# Patient Record
Sex: FEMALE | Race: WHITE | NOT HISPANIC OR LATINO | Employment: OTHER | RURAL
[De-identification: names, ages, dates, MRNs, and addresses within clinical notes are randomized per-mention and may not be internally consistent; named-entity substitution may affect disease eponyms.]

---

## 2017-06-16 ENCOUNTER — HISTORICAL (OUTPATIENT)
Dept: ADMINISTRATIVE | Facility: HOSPITAL | Age: 72
End: 2017-06-16

## 2017-06-20 LAB
LAB AP COMMENTS: NORMAL
LAB AP GENERAL CAT - HISTORICAL: NORMAL
LAB AP INTERPRETATION/RESULT - HISTORICAL: NEGATIVE
LAB AP SPECIMEN ADEQUACY - HISTORICAL: NORMAL
LAB AP SPECIMEN SUBMITTED - HISTORICAL: NORMAL

## 2020-08-06 ENCOUNTER — HISTORICAL (OUTPATIENT)
Dept: ADMINISTRATIVE | Facility: HOSPITAL | Age: 75
End: 2020-08-06

## 2020-09-28 ENCOUNTER — HISTORICAL (OUTPATIENT)
Dept: ADMINISTRATIVE | Facility: HOSPITAL | Age: 75
End: 2020-09-28

## 2020-10-20 ENCOUNTER — HISTORICAL (OUTPATIENT)
Dept: ADMINISTRATIVE | Facility: HOSPITAL | Age: 75
End: 2020-10-20

## 2020-10-20 LAB
ALBUMIN SERPL BCP-MCNC: 4.1 G/DL (ref 3.5–5)
ALBUMIN/GLOB SERPL: 1.4 {RATIO}
ALP SERPL-CCNC: 56 U/L (ref 55–142)
ALT SERPL W P-5'-P-CCNC: 20 U/L (ref 13–56)
ANION GAP SERPL CALCULATED.3IONS-SCNC: 5 MMOL/L (ref 7–16)
AST SERPL W P-5'-P-CCNC: 22 U/L (ref 15–37)
BASOPHILS # BLD AUTO: 0.02 X10E3/UL (ref 0–0.2)
BASOPHILS NFR BLD AUTO: 0.4 % (ref 0–1)
BILIRUB SERPL-MCNC: 0.6 MG/DL (ref 0–1.2)
BUN SERPL-MCNC: 23 MG/DL (ref 7–18)
BUN/CREAT SERPL: 21
CALCIUM SERPL-MCNC: 9.1 MG/DL (ref 8.5–10.1)
CHLORIDE SERPL-SCNC: 106 MMOL/L (ref 98–107)
CO2 SERPL-SCNC: 35 MMOL/L (ref 21–32)
CREAT SERPL-MCNC: 1.09 MG/DL (ref 0.5–1.02)
EOSINOPHIL # BLD AUTO: 0.1 X10E3/UL (ref 0–0.5)
EOSINOPHIL NFR BLD AUTO: 2.1 % (ref 1–4)
ERYTHROCYTE [DISTWIDTH] IN BLOOD BY AUTOMATED COUNT: 13.2 % (ref 11.5–14.5)
GLOBULIN SER-MCNC: 3 G/DL (ref 2–4)
GLUCOSE SERPL-MCNC: 76 MG/DL (ref 74–106)
HCT VFR BLD AUTO: 42.4 % (ref 38–47)
HGB BLD-MCNC: 14 G/DL (ref 12–16)
IMM GRANULOCYTES # BLD AUTO: 0.01 X10E3/UL (ref 0–0.04)
IMM GRANULOCYTES NFR BLD: 0.2 % (ref 0–0.4)
LYMPHOCYTES # BLD AUTO: 1.6 X10E3/UL (ref 1–4.8)
LYMPHOCYTES NFR BLD AUTO: 34.3 % (ref 27–41)
MCH RBC QN AUTO: 31 PG (ref 27–31)
MCHC RBC AUTO-ENTMCNC: 33 G/DL (ref 32–36)
MCV RBC AUTO: 93.8 FL (ref 80–96)
MONOCYTES # BLD AUTO: 0.42 X10E3/UL (ref 0–0.8)
MONOCYTES NFR BLD AUTO: 9 % (ref 2–6)
MPC BLD CALC-MCNC: 11.2 FL (ref 9.4–12.4)
NEUTROPHILS # BLD AUTO: 2.51 X10E3/UL (ref 1.8–7.7)
NEUTROPHILS NFR BLD AUTO: 54 % (ref 53–65)
NRBC # BLD AUTO: 0 X10E3/UL (ref 0–0)
NRBC, AUTO (.00): 0 /100 (ref 0–0)
PLATELET # BLD AUTO: 150 X10E3/UL (ref 150–400)
POTASSIUM SERPL-SCNC: 3.7 MMOL/L (ref 3.5–5.1)
PROT SERPL-MCNC: 7.1 G/DL (ref 6.4–8.2)
RBC # BLD AUTO: 4.52 X10E6/UL (ref 4.2–5.4)
SARS-COV+SARS-COV-2 AG RESP QL IA.RAPID: NEGATIVE
SODIUM SERPL-SCNC: 142 MMOL/L (ref 136–145)
WBC # BLD AUTO: 4.66 X10E3/UL (ref 4.5–11)

## 2021-04-23 DIAGNOSIS — Z85.038 HX OF COLON CANCER, STAGE I: Primary | ICD-10-CM

## 2021-05-14 DIAGNOSIS — Z11.59 SPECIAL SCREENING EXAMINATION FOR VIRAL DISEASE: Primary | ICD-10-CM

## 2021-05-17 ENCOUNTER — ANESTHESIA (OUTPATIENT)
Dept: GASTROENTEROLOGY | Facility: HOSPITAL | Age: 76
End: 2021-05-17
Payer: MEDICARE

## 2021-05-17 ENCOUNTER — HOSPITAL ENCOUNTER (OUTPATIENT)
Dept: GASTROENTEROLOGY | Facility: HOSPITAL | Age: 76
Discharge: HOME | End: 2021-05-17
Attending: INTERNAL MEDICINE
Payer: MEDICARE

## 2021-05-17 ENCOUNTER — ANESTHESIA EVENT (OUTPATIENT)
Dept: GASTROENTEROLOGY | Facility: HOSPITAL | Age: 76
End: 2021-05-17
Payer: MEDICARE

## 2021-05-17 VITALS
WEIGHT: 120 LBS | DIASTOLIC BLOOD PRESSURE: 75 MMHG | HEART RATE: 75 BPM | BODY MASS INDEX: 20.49 KG/M2 | TEMPERATURE: 97 F | RESPIRATION RATE: 16 BRPM | OXYGEN SATURATION: 100 % | HEIGHT: 64 IN | SYSTOLIC BLOOD PRESSURE: 165 MMHG

## 2021-05-17 DIAGNOSIS — K64.4 EXTERNAL HEMORRHOIDS: ICD-10-CM

## 2021-05-17 DIAGNOSIS — Z85.038 HX OF COLON CANCER, STAGE I: ICD-10-CM

## 2021-05-17 DIAGNOSIS — K57.30 DIVERTICULA, COLON: ICD-10-CM

## 2021-05-17 DIAGNOSIS — Z85.038 HISTORY OF COLON CANCER, STAGE I: ICD-10-CM

## 2021-05-17 PROCEDURE — D9220A PRA ANESTHESIA: Mod: ,,, | Performed by: NURSE ANESTHETIST, CERTIFIED REGISTERED

## 2021-05-17 PROCEDURE — 37000009 HC ANESTHESIA EA ADD 15 MINS

## 2021-05-17 PROCEDURE — 25000003 PHARM REV CODE 250: Performed by: NURSE ANESTHETIST, CERTIFIED REGISTERED

## 2021-05-17 PROCEDURE — 27201423 OPTIME MED/SURG SUP & DEVICES STERILE SUPPLY

## 2021-05-17 PROCEDURE — 63600175 PHARM REV CODE 636 W HCPCS: Performed by: NURSE ANESTHETIST, CERTIFIED REGISTERED

## 2021-05-17 PROCEDURE — D9220A PRA ANESTHESIA: ICD-10-PCS | Mod: ,,, | Performed by: NURSE ANESTHETIST, CERTIFIED REGISTERED

## 2021-05-17 PROCEDURE — 37000008 HC ANESTHESIA 1ST 15 MINUTES

## 2021-05-17 PROCEDURE — G0105 COLORECTAL SCRN; HI RISK IND: HCPCS

## 2021-05-17 RX ORDER — FLUTICASONE PROPIONATE AND SALMETEROL XINAFOATE 45; 21 UG/1; UG/1
2 AEROSOL, METERED RESPIRATORY (INHALATION) DAILY
COMMUNITY
End: 2021-06-16

## 2021-05-17 RX ORDER — LIDOCAINE HYDROCHLORIDE 20 MG/ML
INJECTION, SOLUTION EPIDURAL; INFILTRATION; INTRACAUDAL; PERINEURAL
Status: DISCONTINUED | OUTPATIENT
Start: 2021-05-17 | End: 2021-05-17

## 2021-05-17 RX ORDER — SODIUM CHLORIDE 0.9 % (FLUSH) 0.9 %
10 SYRINGE (ML) INJECTION
Status: DISCONTINUED | OUTPATIENT
Start: 2021-05-17 | End: 2021-05-18 | Stop reason: HOSPADM

## 2021-05-17 RX ORDER — PROPOFOL 10 MG/ML
INJECTION, EMULSION INTRAVENOUS
Status: DISCONTINUED | OUTPATIENT
Start: 2021-05-17 | End: 2021-05-17

## 2021-05-17 RX ORDER — VENLAFAXINE 37.5 MG/1
37.5 TABLET ORAL DAILY
COMMUNITY
End: 2021-09-08 | Stop reason: ALTCHOICE

## 2021-05-17 RX ORDER — PRAVASTATIN SODIUM 40 MG/1
40 TABLET ORAL DAILY
COMMUNITY
End: 2021-07-22 | Stop reason: SDUPTHER

## 2021-05-17 RX ORDER — IPRATROPIUM BROMIDE 0.5 MG/2.5ML
500 SOLUTION RESPIRATORY (INHALATION) 3 TIMES DAILY PRN
COMMUNITY
End: 2023-04-12

## 2021-05-17 RX ORDER — LORATADINE 10 MG/1
10 TABLET ORAL DAILY
COMMUNITY

## 2021-05-17 RX ORDER — PRAVASTATIN SODIUM 20 MG/1
20 TABLET ORAL DAILY
COMMUNITY
End: 2021-05-17 | Stop reason: HOSPADM

## 2021-05-17 RX ADMIN — PROPOFOL 50 MG: 10 INJECTION, EMULSION INTRAVENOUS at 01:05

## 2021-05-17 RX ADMIN — PROPOFOL 100 MG: 10 INJECTION, EMULSION INTRAVENOUS at 01:05

## 2021-05-17 RX ADMIN — LIDOCAINE HYDROCHLORIDE 100 MG: 20 INJECTION, SOLUTION INTRAVENOUS at 01:05

## 2021-05-17 RX ADMIN — SODIUM CHLORIDE: 9 INJECTION, SOLUTION INTRAVENOUS at 01:05

## 2021-06-16 ENCOUNTER — HOSPITAL ENCOUNTER (EMERGENCY)
Facility: HOSPITAL | Age: 76
Discharge: HOME | End: 2021-06-16
Attending: STUDENT IN AN ORGANIZED HEALTH CARE EDUCATION/TRAINING PROGRAM
Payer: MEDICARE

## 2021-06-16 VITALS
HEART RATE: 72 BPM | HEIGHT: 64 IN | DIASTOLIC BLOOD PRESSURE: 96 MMHG | SYSTOLIC BLOOD PRESSURE: 162 MMHG | WEIGHT: 125 LBS | RESPIRATION RATE: 18 BRPM | BODY MASS INDEX: 21.34 KG/M2 | TEMPERATURE: 98 F | OXYGEN SATURATION: 96 %

## 2021-06-16 DIAGNOSIS — S00.03XA HEMATOMA OF SCALP, INITIAL ENCOUNTER: Primary | ICD-10-CM

## 2021-06-16 DIAGNOSIS — K57.30 DIVERTICULA, COLON: ICD-10-CM
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
## 2021-06-16 DIAGNOSIS — M79.641 RIGHT HAND PAIN: ICD-10-CM

## 2021-06-16 DIAGNOSIS — Z85.038 HX OF COLON CANCER, STAGE I: ICD-10-CM

## 2021-06-16 DIAGNOSIS — K64.4 EXTERNAL HEMORRHOIDS: ICD-10-CM

## 2021-06-16 PROCEDURE — 99284 EMERGENCY DEPT VISIT MOD MDM: CPT | Mod: 25 | Performed by: STUDENT IN AN ORGANIZED HEALTH CARE EDUCATION/TRAINING PROGRAM

## 2021-06-16 PROCEDURE — 99282 EMERGENCY DEPT VISIT SF MDM: CPT | Mod: ,,, | Performed by: STUDENT IN AN ORGANIZED HEALTH CARE EDUCATION/TRAINING PROGRAM

## 2021-06-16 PROCEDURE — 99282 PR EMERGENCY DEPT VISIT,LEVEL II: ICD-10-PCS | Mod: ,,, | Performed by: STUDENT IN AN ORGANIZED HEALTH CARE EDUCATION/TRAINING PROGRAM

## 2021-06-16 RX ORDER — ASPIRIN 81 MG/1
81 TABLET ORAL DAILY
COMMUNITY

## 2021-07-22 ENCOUNTER — OFFICE VISIT (OUTPATIENT)
Dept: FAMILY MEDICINE | Facility: CLINIC | Age: 76
End: 2021-07-22
Payer: MEDICARE

## 2021-07-22 VITALS
OXYGEN SATURATION: 96 % | TEMPERATURE: 98 F | HEART RATE: 71 BPM | RESPIRATION RATE: 20 BRPM | WEIGHT: 126 LBS | HEIGHT: 64 IN | DIASTOLIC BLOOD PRESSURE: 68 MMHG | BODY MASS INDEX: 21.51 KG/M2 | SYSTOLIC BLOOD PRESSURE: 116 MMHG

## 2021-07-22 DIAGNOSIS — Z87.440 HISTORY OF UTI: ICD-10-CM

## 2021-07-22 DIAGNOSIS — E78.49 OTHER HYPERLIPIDEMIA: ICD-10-CM

## 2021-07-22 DIAGNOSIS — Z91.89 AT RISK OF UTI: ICD-10-CM

## 2021-07-22 DIAGNOSIS — Z13.9 SCREENING FOR CONDITION: Primary | ICD-10-CM

## 2021-07-22 PROBLEM — E78.5 HYPERLIPIDEMIA: Status: ACTIVE | Noted: 2021-07-22

## 2021-07-22 LAB
ALBUMIN SERPL BCP-MCNC: 3.8 G/DL (ref 3.5–5)
ALBUMIN/GLOB SERPL: 1.2 {RATIO}
ALP SERPL-CCNC: 63 U/L (ref 55–142)
ALT SERPL W P-5'-P-CCNC: 24 U/L (ref 13–56)
ANION GAP SERPL CALCULATED.3IONS-SCNC: 7 MMOL/L (ref 7–16)
AST SERPL W P-5'-P-CCNC: 21 U/L (ref 15–37)
BASOPHILS # BLD AUTO: 0.04 K/UL (ref 0–0.2)
BASOPHILS NFR BLD AUTO: 0.9 % (ref 0–1)
BILIRUB SERPL-MCNC: 0.6 MG/DL (ref 0–1.2)
BUN SERPL-MCNC: 19 MG/DL (ref 7–18)
BUN/CREAT SERPL: 20 (ref 6–20)
CALCIUM SERPL-MCNC: 9.2 MG/DL (ref 8.5–10.1)
CHLORIDE SERPL-SCNC: 105 MMOL/L (ref 98–107)
CO2 SERPL-SCNC: 32 MMOL/L (ref 21–32)
CREAT SERPL-MCNC: 0.97 MG/DL (ref 0.55–1.02)
DIFFERENTIAL METHOD BLD: ABNORMAL
EOSINOPHIL # BLD AUTO: 0.12 K/UL (ref 0–0.5)
EOSINOPHIL NFR BLD AUTO: 2.6 % (ref 1–4)
ERYTHROCYTE [DISTWIDTH] IN BLOOD BY AUTOMATED COUNT: 13.1 % (ref 11.5–14.5)
GLOBULIN SER-MCNC: 3.2 G/DL (ref 2–4)
GLUCOSE SERPL-MCNC: 97 MG/DL (ref 74–106)
HCT VFR BLD AUTO: 40 % (ref 38–47)
HGB BLD-MCNC: 13.3 G/DL (ref 12–16)
IMM GRANULOCYTES # BLD AUTO: 0.01 K/UL (ref 0–0.04)
IMM GRANULOCYTES NFR BLD: 0.2 % (ref 0–0.4)
LYMPHOCYTES # BLD AUTO: 1.43 K/UL (ref 1–4.8)
LYMPHOCYTES NFR BLD AUTO: 30.4 % (ref 27–41)
MCH RBC QN AUTO: 30.9 PG (ref 27–31)
MCHC RBC AUTO-ENTMCNC: 33.3 G/DL (ref 32–36)
MCV RBC AUTO: 93 FL (ref 80–96)
MONOCYTES # BLD AUTO: 0.33 K/UL (ref 0–0.8)
MONOCYTES NFR BLD AUTO: 7 % (ref 2–6)
MPC BLD CALC-MCNC: 10.9 FL (ref 9.4–12.4)
NEUTROPHILS # BLD AUTO: 2.77 K/UL (ref 1.8–7.7)
NEUTROPHILS NFR BLD AUTO: 58.9 % (ref 53–65)
NRBC # BLD AUTO: 0 X10E3/UL
NRBC, AUTO (.00): 0 %
PLATELET # BLD AUTO: 140 K/UL (ref 150–400)
POTASSIUM SERPL-SCNC: 4.1 MMOL/L (ref 3.5–5.1)
PROT SERPL-MCNC: 7 G/DL (ref 6.4–8.2)
RBC # BLD AUTO: 4.3 M/UL (ref 4.2–5.4)
SODIUM SERPL-SCNC: 140 MMOL/L (ref 136–145)
WBC # BLD AUTO: 4.7 K/UL (ref 4.5–11)

## 2021-07-22 PROCEDURE — 80053 COMPREHEN METABOLIC PANEL: CPT | Mod: ,,, | Performed by: CLINICAL MEDICAL LABORATORY

## 2021-07-22 PROCEDURE — 99214 PR OFFICE/OUTPT VISIT, EST, LEVL IV, 30-39 MIN: ICD-10-PCS | Mod: GC,,, | Performed by: FAMILY MEDICINE

## 2021-07-22 PROCEDURE — 85025 CBC WITH DIFFERENTIAL: ICD-10-PCS | Mod: ,,, | Performed by: CLINICAL MEDICAL LABORATORY

## 2021-07-22 PROCEDURE — 85025 COMPLETE CBC W/AUTO DIFF WBC: CPT | Mod: ,,, | Performed by: CLINICAL MEDICAL LABORATORY

## 2021-07-22 PROCEDURE — 99214 OFFICE O/P EST MOD 30 MIN: CPT | Mod: GC,,, | Performed by: FAMILY MEDICINE

## 2021-07-22 PROCEDURE — 80053 COMPREHENSIVE METABOLIC PANEL: ICD-10-PCS | Mod: ,,, | Performed by: CLINICAL MEDICAL LABORATORY

## 2021-07-22 RX ORDER — PRAVASTATIN SODIUM 40 MG/1
40 TABLET ORAL DAILY
Qty: 90 TABLET | Refills: 1 | Status: SHIPPED | OUTPATIENT
Start: 2021-07-22 | End: 2021-10-27 | Stop reason: SDUPTHER

## 2021-07-23 PROBLEM — Z91.89 AT RISK OF UTI: Status: ACTIVE | Noted: 2021-07-23

## 2021-09-08 ENCOUNTER — OFFICE VISIT (OUTPATIENT)
Dept: FAMILY MEDICINE | Facility: CLINIC | Age: 76
End: 2021-09-08
Payer: MEDICARE

## 2021-09-08 VITALS
DIASTOLIC BLOOD PRESSURE: 73 MMHG | WEIGHT: 125 LBS | SYSTOLIC BLOOD PRESSURE: 154 MMHG | BODY MASS INDEX: 21.34 KG/M2 | RESPIRATION RATE: 20 BRPM | HEIGHT: 64 IN | OXYGEN SATURATION: 97 % | HEART RATE: 62 BPM | TEMPERATURE: 97 F

## 2021-09-08 DIAGNOSIS — F32.A DEPRESSION, UNSPECIFIED DEPRESSION TYPE: Primary | ICD-10-CM

## 2021-09-08 DIAGNOSIS — I10 HYPERTENSION, UNSPECIFIED TYPE: ICD-10-CM

## 2021-09-08 DIAGNOSIS — E04.1 THYROID NODULE: ICD-10-CM

## 2021-09-08 DIAGNOSIS — R09.89 BRUIT OF RIGHT CAROTID ARTERY: ICD-10-CM

## 2021-09-08 DIAGNOSIS — H81.10 BPPV (BENIGN PAROXYSMAL POSITIONAL VERTIGO), UNSPECIFIED LATERALITY: ICD-10-CM

## 2021-09-08 DIAGNOSIS — J30.89 NON-SEASONAL ALLERGIC RHINITIS, UNSPECIFIED TRIGGER: ICD-10-CM

## 2021-09-08 PROCEDURE — 99214 PR OFFICE/OUTPT VISIT, EST, LEVL IV, 30-39 MIN: ICD-10-PCS | Mod: GC,,, | Performed by: FAMILY MEDICINE

## 2021-09-08 PROCEDURE — 99214 OFFICE O/P EST MOD 30 MIN: CPT | Mod: GC,,, | Performed by: FAMILY MEDICINE

## 2021-09-08 RX ORDER — CITALOPRAM 20 MG/1
20 TABLET, FILM COATED ORAL DAILY
Qty: 30 TABLET | Refills: 11 | Status: SHIPPED | OUTPATIENT
Start: 2021-09-08 | End: 2022-08-09 | Stop reason: SDUPTHER

## 2021-09-14 ENCOUNTER — HOSPITAL ENCOUNTER (OUTPATIENT)
Dept: RADIOLOGY | Facility: HOSPITAL | Age: 76
Discharge: HOME | End: 2021-09-14
Attending: FAMILY MEDICINE
Payer: MEDICARE

## 2021-09-14 DIAGNOSIS — R09.89 BRUIT OF RIGHT CAROTID ARTERY: ICD-10-CM

## 2021-09-14 PROCEDURE — 93880 EXTRACRANIAL BILAT STUDY: CPT | Mod: 26,,, | Performed by: STUDENT IN AN ORGANIZED HEALTH CARE EDUCATION/TRAINING PROGRAM

## 2021-09-14 PROCEDURE — 93880 US CAROTID BILATERAL: ICD-10-PCS | Mod: 26,,, | Performed by: STUDENT IN AN ORGANIZED HEALTH CARE EDUCATION/TRAINING PROGRAM

## 2021-09-14 PROCEDURE — 93880 EXTRACRANIAL BILAT STUDY: CPT | Mod: TC

## 2021-09-22 ENCOUNTER — OFFICE VISIT (OUTPATIENT)
Dept: FAMILY MEDICINE | Facility: CLINIC | Age: 76
End: 2021-09-22
Payer: MEDICARE

## 2021-09-22 VITALS
WEIGHT: 127.81 LBS | OXYGEN SATURATION: 98 % | TEMPERATURE: 99 F | HEART RATE: 65 BPM | SYSTOLIC BLOOD PRESSURE: 119 MMHG | BODY MASS INDEX: 21.94 KG/M2 | DIASTOLIC BLOOD PRESSURE: 67 MMHG

## 2021-09-22 DIAGNOSIS — H81.10 BPPV (BENIGN PAROXYSMAL POSITIONAL VERTIGO), UNSPECIFIED LATERALITY: ICD-10-CM

## 2021-09-22 DIAGNOSIS — J30.89 NON-SEASONAL ALLERGIC RHINITIS, UNSPECIFIED TRIGGER: Primary | ICD-10-CM

## 2021-09-22 DIAGNOSIS — I10 HYPERTENSION, UNSPECIFIED TYPE: ICD-10-CM

## 2021-09-22 DIAGNOSIS — F32.A DEPRESSION, UNSPECIFIED DEPRESSION TYPE: ICD-10-CM

## 2021-09-22 DIAGNOSIS — R09.89 BRUIT OF RIGHT CAROTID ARTERY: ICD-10-CM

## 2021-09-22 PROCEDURE — 99213 PR OFFICE/OUTPT VISIT, EST, LEVL III, 20-29 MIN: ICD-10-PCS | Mod: GC,,, | Performed by: FAMILY MEDICINE

## 2021-09-22 PROCEDURE — 99213 OFFICE O/P EST LOW 20 MIN: CPT | Mod: GC,,, | Performed by: FAMILY MEDICINE

## 2021-09-22 RX ORDER — IPRATROPIUM BROMIDE 21 UG/1
2 SPRAY, METERED NASAL 3 TIMES DAILY
Qty: 30 ML | Refills: 0 | Status: SHIPPED | OUTPATIENT
Start: 2021-09-22 | End: 2023-04-12

## 2021-10-25 RX ORDER — PRAVASTATIN SODIUM 40 MG/1
40 TABLET ORAL DAILY
Qty: 90 TABLET | Refills: 1 | Status: CANCELLED | OUTPATIENT
Start: 2021-10-25 | End: 2022-04-23

## 2022-03-11 DIAGNOSIS — Z71.89 COMPLEX CARE COORDINATION: ICD-10-CM

## 2022-08-09 ENCOUNTER — OFFICE VISIT (OUTPATIENT)
Dept: FAMILY MEDICINE | Facility: CLINIC | Age: 77
End: 2022-08-09
Payer: MEDICARE

## 2022-08-09 VITALS
RESPIRATION RATE: 20 BRPM | HEIGHT: 64 IN | WEIGHT: 125 LBS | DIASTOLIC BLOOD PRESSURE: 83 MMHG | TEMPERATURE: 98 F | OXYGEN SATURATION: 98 % | BODY MASS INDEX: 21.34 KG/M2 | SYSTOLIC BLOOD PRESSURE: 171 MMHG | HEART RATE: 65 BPM

## 2022-08-09 DIAGNOSIS — F32.A DEPRESSION, UNSPECIFIED DEPRESSION TYPE: ICD-10-CM

## 2022-08-09 DIAGNOSIS — E78.2 MIXED HYPERLIPIDEMIA: Primary | ICD-10-CM

## 2022-08-09 PROCEDURE — 99214 OFFICE O/P EST MOD 30 MIN: CPT | Mod: ,,, | Performed by: FAMILY MEDICINE

## 2022-08-09 PROCEDURE — 99214 PR OFFICE/OUTPT VISIT, EST, LEVL IV, 30-39 MIN: ICD-10-PCS | Mod: ,,, | Performed by: FAMILY MEDICINE

## 2022-08-09 RX ORDER — CITALOPRAM 20 MG/1
20 TABLET, FILM COATED ORAL DAILY
Qty: 30 TABLET | Refills: 11 | Status: SHIPPED | OUTPATIENT
Start: 2022-08-09 | End: 2022-12-07 | Stop reason: SDUPTHER

## 2022-08-09 RX ORDER — PRAVASTATIN SODIUM 40 MG/1
40 TABLET ORAL DAILY
Qty: 90 TABLET | Refills: 1 | Status: SHIPPED | OUTPATIENT
Start: 2022-08-09 | End: 2022-12-07 | Stop reason: SDUPTHER

## 2022-08-09 NOTE — PROGRESS NOTES
Subjective:       Patient ID: Shelbi May is a 77 y.o. female.    Chief Complaint: Medication Refill    77 y.o. F with a PMHx of HTN, HLD, depression, and seasonal allergies presented to the clinic for medication refills today. Pt reports she has no complaints at this time and just needs a refill on citalopram and pravastatin. PT counseled on preventative labs such as hep C screening, HIV screening, lipid panel, dexa scan, and vaccines. Pt refused and stated that she is in hurry because her  is in the car. Pt agreed to take the BP log paper and come back in two weeks for further evaluation.       Current Outpatient Medications:     aspirin (ECOTRIN) 81 MG EC tablet, Take 81 mg by mouth once daily., Disp: , Rfl:     fluticasone (VERAMYST) 27.5 mcg/actuation nasal spray, 2 sprays by Nasal route once daily., Disp: , Rfl:     ipratropium (ATROVENT) 0.02 % nebulizer solution, Take 500 mcg by nebulization 3 (three) times daily as needed for Wheezing. Rescue, Disp: , Rfl:     ipratropium (ATROVENT) 21 mcg (0.03 %) nasal spray, 2 sprays by Nasal route 3 (three) times daily., Disp: 30 mL, Rfl: 0    loratadine (CLARITIN) 10 mg tablet, Take 10 mg by mouth once daily., Disp: , Rfl:     citalopram (CELEXA) 20 MG tablet, Take 1 tablet (20 mg total) by mouth once daily., Disp: 30 tablet, Rfl: 11    pravastatin (PRAVACHOL) 40 MG tablet, Take 1 tablet (40 mg total) by mouth once daily., Disp: 90 tablet, Rfl: 1    Review of patient's allergies indicates:  No Known Allergies    Past Medical History:   Diagnosis Date    Cancer     colon cancer no treatment     Diverticula, colon 5/17/2021    History of colon cancer, stage I 5/17/2021    Stroke        Past Surgical History:   Procedure Laterality Date    TONSILLECTOMY         Family History   Problem Relation Age of Onset    Hypertension Mother     Heart disease Father     Cancer Brother        Social History     Tobacco Use    Smoking status: Never  Smoker    Smokeless tobacco: Never Used   Substance Use Topics    Alcohol use: Never    Drug use: Never       Review of Systems   Constitutional: Negative for chills and fever.   HENT: Negative for hearing loss and trouble swallowing.    Eyes: Negative for visual disturbance.   Respiratory: Negative for cough, chest tightness and shortness of breath.    Cardiovascular: Negative for chest pain and leg swelling.   Gastrointestinal: Negative for abdominal pain, diarrhea, nausea and vomiting.   Genitourinary: Negative for dysuria and hematuria.   Musculoskeletal: Negative for myalgias.   Integumentary:  Negative for rash.   Neurological: Negative for dizziness, weakness, light-headedness and headaches.   Psychiatric/Behavioral: Negative for suicidal ideas. The patient is not hyperactive.          Current Medications:   Medication List with Changes/Refills   Current Medications    ASPIRIN (ECOTRIN) 81 MG EC TABLET    Take 81 mg by mouth once daily.       Start Date: --        End Date: --    FLUTICASONE (VERAMYST) 27.5 MCG/ACTUATION NASAL SPRAY    2 sprays by Nasal route once daily.       Start Date: --        End Date: --    IPRATROPIUM (ATROVENT) 0.02 % NEBULIZER SOLUTION    Take 500 mcg by nebulization 3 (three) times daily as needed for Wheezing. Rescue       Start Date: --        End Date: --    IPRATROPIUM (ATROVENT) 21 MCG (0.03 %) NASAL SPRAY    2 sprays by Nasal route 3 (three) times daily.       Start Date: 9/22/2021 End Date: --    LORATADINE (CLARITIN) 10 MG TABLET    Take 10 mg by mouth once daily.       Start Date: --        End Date: --   Changed and/or Refilled Medications    Modified Medication Previous Medication    CITALOPRAM (CELEXA) 20 MG TABLET citalopram (CELEXA) 20 MG tablet       Take 1 tablet (20 mg total) by mouth once daily.    Take 1 tablet (20 mg total) by mouth once daily.       Start Date: 8/9/2022  End Date: 8/9/2023    Start Date: 9/8/2021  End Date: 8/9/2022    PRAVASTATIN  "(PRAVACHOL) 40 MG TABLET pravastatin (PRAVACHOL) 40 MG tablet       Take 1 tablet (40 mg total) by mouth once daily.    Take 1 tablet (40 mg total) by mouth once daily.       Start Date: 8/9/2022  End Date: 2/5/2023    Start Date: 11/29/2021End Date: 8/9/2022            Objective:        Vitals:    08/09/22 1518   BP: (!) 171/83   BP Location: Right arm   Patient Position: Sitting   BP Method: Medium (Automatic)   Pulse: 65   Resp: 20   Temp: 98.3 °F (36.8 °C)   TempSrc: Oral   SpO2: 98%   Weight: 56.7 kg (125 lb)   Height: 5' 4" (1.626 m)       Physical Exam  Vitals reviewed.   Constitutional:       General: She is not in acute distress.     Appearance: Normal appearance. She is not toxic-appearing.   HENT:      Head: Normocephalic and atraumatic.      Right Ear: External ear normal.      Left Ear: External ear normal.      Nose: Nose normal.      Mouth/Throat:      Mouth: Mucous membranes are moist.      Pharynx: Oropharynx is clear.   Eyes:      General: No scleral icterus.     Extraocular Movements: Extraocular movements intact.      Conjunctiva/sclera: Conjunctivae normal.      Pupils: Pupils are equal, round, and reactive to light.   Cardiovascular:      Rate and Rhythm: Normal rate and regular rhythm.      Pulses: Normal pulses.      Heart sounds: Normal heart sounds. No murmur heard.  Pulmonary:      Effort: Pulmonary effort is normal. No respiratory distress.      Breath sounds: Normal breath sounds. No wheezing, rhonchi or rales.   Abdominal:      General: Abdomen is flat. Bowel sounds are normal. There is no distension.      Palpations: Abdomen is soft.      Tenderness: There is no abdominal tenderness. There is no guarding or rebound.   Musculoskeletal:         General: No swelling. Normal range of motion.      Cervical back: Normal range of motion and neck supple. No rigidity.   Skin:     General: Skin is warm and dry.      Capillary Refill: Capillary refill takes less than 2 seconds.      Findings: No " rash.   Neurological:      General: No focal deficit present.      Mental Status: She is alert and oriented to person, place, and time. Mental status is at baseline.   Psychiatric:         Mood and Affect: Mood normal.         Behavior: Behavior normal.         Thought Content: Thought content normal.         Judgment: Judgment normal.               Lab Results   Component Value Date    WBC 4.70 07/22/2021    HGB 13.3 07/22/2021    HCT 40.0 07/22/2021     (L) 07/22/2021    ALT 24 07/22/2021    AST 21 07/22/2021     07/22/2021    K 4.1 07/22/2021     07/22/2021    CREATININE 0.97 07/22/2021    BUN 19 (H) 07/22/2021    CO2 32 07/22/2021      Assessment:       1. Mixed hyperlipidemia    2. Depression, unspecified depression type        Plan:         Problem List Items Addressed This Visit        Psychiatric    Depression    Relevant Medications    citalopram (CELEXA) 20 MG tablet       Cardiac/Vascular    Hyperlipidemia - Primary    Relevant Medications    pravastatin (PRAVACHOL) 40 MG tablet            Follow up in about 2 weeks (around 8/23/2022) for medication management and close care gaps and check BP. refused all labs and vaccines pending on care gaps. Counseled on monitoring BP and that if SBP >180 or DBP >110 then patient needs to go to the ER.    Faviola Hays DO     Instructed patient that if symptoms fail to improve or worsen patient should seek immediate medical attention or report to the nearest emergency department. Patient expressed verbal agreement and understanding to this plan of care.

## 2022-10-09 DIAGNOSIS — Z71.89 COMPLEX CARE COORDINATION: ICD-10-CM

## 2022-12-07 ENCOUNTER — OFFICE VISIT (OUTPATIENT)
Dept: FAMILY MEDICINE | Facility: CLINIC | Age: 77
End: 2022-12-07
Payer: MEDICARE

## 2022-12-07 VITALS
WEIGHT: 126.38 LBS | BODY MASS INDEX: 21.57 KG/M2 | RESPIRATION RATE: 20 BRPM | OXYGEN SATURATION: 97 % | HEART RATE: 71 BPM | SYSTOLIC BLOOD PRESSURE: 127 MMHG | HEIGHT: 64 IN | TEMPERATURE: 98 F | DIASTOLIC BLOOD PRESSURE: 61 MMHG

## 2022-12-07 DIAGNOSIS — Z23 ENCOUNTER FOR ADMINISTRATION OF VACCINE: ICD-10-CM

## 2022-12-07 DIAGNOSIS — H81.10 BPPV (BENIGN PAROXYSMAL POSITIONAL VERTIGO), UNSPECIFIED LATERALITY: ICD-10-CM

## 2022-12-07 DIAGNOSIS — Z13.820 OSTEOPOROSIS SCREENING: Primary | ICD-10-CM

## 2022-12-07 DIAGNOSIS — Z78.0 ASYMPTOMATIC MENOPAUSAL STATE: ICD-10-CM

## 2022-12-07 DIAGNOSIS — J30.89 NON-SEASONAL ALLERGIC RHINITIS, UNSPECIFIED TRIGGER: ICD-10-CM

## 2022-12-07 DIAGNOSIS — I10 HYPERTENSION, UNSPECIFIED TYPE: ICD-10-CM

## 2022-12-07 DIAGNOSIS — E78.2 MIXED HYPERLIPIDEMIA: ICD-10-CM

## 2022-12-07 DIAGNOSIS — F32.A DEPRESSION, UNSPECIFIED DEPRESSION TYPE: ICD-10-CM

## 2022-12-07 PROCEDURE — G0009 ADMIN PNEUMOCOCCAL VACCINE: HCPCS | Mod: ,,, | Performed by: FAMILY MEDICINE

## 2022-12-07 PROCEDURE — 99213 PR OFFICE/OUTPT VISIT, EST, LEVL III, 20-29 MIN: ICD-10-PCS | Mod: GC,,, | Performed by: FAMILY MEDICINE

## 2022-12-07 PROCEDURE — G0009 PNEUMOCOCCAL CONJUGATE VACCINE 20-VALENT: ICD-10-PCS | Mod: ,,, | Performed by: FAMILY MEDICINE

## 2022-12-07 PROCEDURE — 90677 PCV20 VACCINE IM: CPT | Mod: ,,, | Performed by: FAMILY MEDICINE

## 2022-12-07 PROCEDURE — 99213 OFFICE O/P EST LOW 20 MIN: CPT | Mod: GC,,, | Performed by: FAMILY MEDICINE

## 2022-12-07 PROCEDURE — 90677 PNEUMOCOCCAL CONJUGATE VACCINE 20-VALENT: ICD-10-PCS | Mod: ,,, | Performed by: FAMILY MEDICINE

## 2022-12-07 RX ORDER — CITALOPRAM 20 MG/1
20 TABLET, FILM COATED ORAL DAILY
Qty: 90 TABLET | Refills: 3 | Status: SHIPPED | OUTPATIENT
Start: 2022-12-07 | End: 2023-04-12 | Stop reason: DRUGHIGH

## 2022-12-07 RX ORDER — PRAVASTATIN SODIUM 40 MG/1
40 TABLET ORAL DAILY
Qty: 90 TABLET | Refills: 3 | Status: SHIPPED | OUTPATIENT
Start: 2022-12-07 | End: 2024-01-05 | Stop reason: SDUPTHER

## 2022-12-09 NOTE — PROGRESS NOTES
Subjective:       Patient ID: Shelbi May is a 77 y.o. female.    Chief Complaint: Follow-up and Medication Refill    78 yo female with PMH of CVA, HTN, DLD, CKD, thyroid nodule, MDD and BPPV presents for follow up without complaints. She will be having cataract surgery in January with Dr. Valente. See assessment and plan for details.       Current Outpatient Medications:     loratadine (CLARITIN) 10 mg tablet, Take 10 mg by mouth once daily., Disp: , Rfl:     aspirin (ECOTRIN) 81 MG EC tablet, Take 81 mg by mouth once daily., Disp: , Rfl:     citalopram (CELEXA) 20 MG tablet, Take 1 tablet (20 mg total) by mouth once daily., Disp: 90 tablet, Rfl: 3    fluticasone (VERAMYST) 27.5 mcg/actuation nasal spray, 2 sprays by Nasal route once daily., Disp: , Rfl:     ipratropium (ATROVENT) 0.02 % nebulizer solution, Take 500 mcg by nebulization 3 (three) times daily as needed for Wheezing. Rescue, Disp: , Rfl:     ipratropium (ATROVENT) 21 mcg (0.03 %) nasal spray, 2 sprays by Nasal route 3 (three) times daily. (Patient not taking: Reported on 12/7/2022), Disp: 30 mL, Rfl: 0    pravastatin (PRAVACHOL) 40 MG tablet, Take 1 tablet (40 mg total) by mouth once daily., Disp: 90 tablet, Rfl: 3    Review of patient's allergies indicates:  No Known Allergies    Past Medical History:   Diagnosis Date    Cancer     colon cancer no treatment     Diverticula, colon 5/17/2021    History of colon cancer, stage I 5/17/2021    Stroke        Past Surgical History:   Procedure Laterality Date    TONSILLECTOMY         Family History   Problem Relation Age of Onset    Hypertension Mother     Heart disease Father     Cancer Brother        Social History     Tobacco Use    Smoking status: Never    Smokeless tobacco: Never   Substance Use Topics    Alcohol use: Never    Drug use: Never       Review of Systems   Constitutional:  Negative for chills, fatigue, fever and unexpected weight change.   HENT:  Negative for nasal  congestion, hearing loss, postnasal drip and rhinorrhea.    Eyes:  Negative for visual disturbance.   Respiratory:  Negative for cough, chest tightness, shortness of breath, wheezing and stridor.    Cardiovascular:  Negative for chest pain, palpitations and leg swelling.   Gastrointestinal:  Negative for abdominal pain, blood in stool, change in bowel habit, constipation, diarrhea, nausea, vomiting, fecal incontinence and change in bowel habit.   Genitourinary:  Negative for decreased urine volume, difficulty urinating, dysuria and hematuria.   Musculoskeletal:  Negative for arthralgias and myalgias.   Integumentary:  Negative for rash.   Neurological:  Negative for dizziness, vertigo, syncope, weakness, light-headedness and headaches.   Psychiatric/Behavioral:  Positive for dysphoric mood. Negative for self-injury and suicidal ideas.        Current Medications:   Medication List with Changes/Refills   Current Medications    ASPIRIN (ECOTRIN) 81 MG EC TABLET    Take 81 mg by mouth once daily.       Start Date: --        End Date: --    FLUTICASONE (VERAMYST) 27.5 MCG/ACTUATION NASAL SPRAY    2 sprays by Nasal route once daily.       Start Date: --        End Date: --    IPRATROPIUM (ATROVENT) 0.02 % NEBULIZER SOLUTION    Take 500 mcg by nebulization 3 (three) times daily as needed for Wheezing. Rescue       Start Date: --        End Date: --    IPRATROPIUM (ATROVENT) 21 MCG (0.03 %) NASAL SPRAY    2 sprays by Nasal route 3 (three) times daily.       Start Date: 9/22/2021 End Date: --    LORATADINE (CLARITIN) 10 MG TABLET    Take 10 mg by mouth once daily.       Start Date: --        End Date: --   Changed and/or Refilled Medications    Modified Medication Previous Medication    CITALOPRAM (CELEXA) 20 MG TABLET citalopram (CELEXA) 20 MG tablet       Take 1 tablet (20 mg total) by mouth once daily.    Take 1 tablet (20 mg total) by mouth once daily.       Start Date: 12/7/2022 End Date: --    Start Date: 8/9/2022  End  "Date: 12/7/2022    PRAVASTATIN (PRAVACHOL) 40 MG TABLET pravastatin (PRAVACHOL) 40 MG tablet       Take 1 tablet (40 mg total) by mouth once daily.    Take 1 tablet (40 mg total) by mouth once daily.       Start Date: 12/7/2022 End Date: --    Start Date: 8/9/2022  End Date: 12/7/2022            Objective:        Vitals:    12/07/22 1041   BP: 127/61   BP Location: Right arm   Patient Position: Sitting   BP Method: Medium (Automatic)   Pulse: 71   Resp: 20   Temp: 98 °F (36.7 °C)   TempSrc: Oral   SpO2: 97%   Weight: 57.3 kg (126 lb 6.4 oz)   Height: 5' 4" (1.626 m)       Physical Exam  Vitals reviewed.   Constitutional:       General: She is not in acute distress.     Appearance: Normal appearance. She is normal weight. She is not ill-appearing, toxic-appearing or diaphoretic.   HENT:      Head: Normocephalic and atraumatic.      Right Ear: Tympanic membrane, ear canal and external ear normal. There is no impacted cerumen.      Left Ear: Tympanic membrane, ear canal and external ear normal. There is no impacted cerumen.      Nose: No rhinorrhea.      Comments: erythematous and edematous nasal mucosa     Mouth/Throat:      Mouth: Mucous membranes are moist.   Eyes:      Extraocular Movements: Extraocular movements intact.      Pupils: Pupils are equal, round, and reactive to light.      Comments: nystagmus with lateral gaze B/L   Neck:      Vascular: Carotid bruit present.      Comments: R carotid bruit  Cardiovascular:      Rate and Rhythm: Normal rate and regular rhythm.      Pulses: Normal pulses.      Heart sounds: Normal heart sounds. No murmur heard.    No friction rub. No gallop.   Pulmonary:      Effort: Pulmonary effort is normal. No respiratory distress.      Breath sounds: Normal breath sounds. No wheezing, rhonchi or rales.   Abdominal:      General: Bowel sounds are normal. There is no distension.      Palpations: Abdomen is soft. There is no mass.      Tenderness: There is no abdominal tenderness. " There is no guarding or rebound.   Musculoskeletal:      Cervical back: No rigidity or tenderness.      Right lower leg: No edema.      Left lower leg: No edema.   Skin:     General: Skin is warm and dry.      Capillary Refill: Capillary refill takes less than 2 seconds.   Neurological:      General: No focal deficit present.      Mental Status: She is alert and oriented to person, place, and time.      Sensory: No sensory deficit.      Motor: No weakness.      Deep Tendon Reflexes: Reflexes normal.   Psychiatric:         Mood and Affect: Mood normal.         Behavior: Behavior normal.         Thought Content: Thought content normal.         Judgment: Judgment normal.           Lab Results   Component Value Date    WBC 4.70 07/22/2021    HGB 13.3 07/22/2021    HCT 40.0 07/22/2021     (L) 07/22/2021    ALT 24 07/22/2021    AST 21 07/22/2021     07/22/2021    K 4.1 07/22/2021     07/22/2021    CREATININE 0.97 07/22/2021    BUN 19 (H) 07/22/2021    CO2 32 07/22/2021      Assessment:       1. Osteoporosis screening    2. Depression, unspecified depression type    3. Mixed hyperlipidemia    4. Encounter for administration of vaccine    5. Asymptomatic menopausal state    6. BPPV (benign paroxysmal positional vertigo), unspecified laterality    7. Non-seasonal allergic rhinitis, unspecified trigger    8. Hypertension, unspecified type          Plan:         Problem List Items Addressed This Visit          Psychiatric    Depression     - Feels that she is well-controlled on Celexa 20 mg.   - F/U 3 months         Relevant Medications    citalopram (CELEXA) 20 MG tablet       ENT    Non-seasonal allergic rhinitis     - Well controled on nasal rinse, flonase, Claritin and Atrovent.         BPPV (benign paroxysmal positional vertigo), unspecified laterality     - Dizziness resolved with home epley maneuver and Antivert            Cardiac/Vascular    Hyperlipidemia     - Continue statin         Relevant  Medications    pravastatin (PRAVACHOL) 40 MG tablet    HTN (hypertension)     - Home -128/ 60s   - Well-controled. Continue current treatment.          Other Visit Diagnoses       Osteoporosis screening    -  Primary    Relevant Orders    DXA Bone Density Spine And Hip    Encounter for administration of vaccine        Relevant Orders    (In Office Administered) Pneumococcal Conjugate Vaccine (20 Valent) (IM) (Completed)    Asymptomatic menopausal state        Relevant Orders    DXA Bone Density Spine And Hip              No follow-ups on file.    Cortney Gutierrez DO     Instructed patient that if symptoms fail to improve or worsen patient should seek immediate medical attention or report to the nearest emergency department. Patient expressed verbal agreement and understanding to this plan of care.

## 2022-12-12 NOTE — PROGRESS NOTES
I have reviewed the notes, assessments, and/or procedures performed by , I concur with her documentation of Shelbi May.

## 2023-03-08 ENCOUNTER — OFFICE VISIT (OUTPATIENT)
Dept: FAMILY MEDICINE | Facility: CLINIC | Age: 78
End: 2023-03-08
Payer: MEDICARE

## 2023-03-08 VITALS
HEIGHT: 64 IN | OXYGEN SATURATION: 97 % | RESPIRATION RATE: 20 BRPM | WEIGHT: 125.38 LBS | DIASTOLIC BLOOD PRESSURE: 66 MMHG | SYSTOLIC BLOOD PRESSURE: 116 MMHG | BODY MASS INDEX: 21.4 KG/M2 | TEMPERATURE: 98 F | HEART RATE: 81 BPM

## 2023-03-08 DIAGNOSIS — F32.4 MAJOR DEPRESSIVE DISORDER IN PARTIAL REMISSION, UNSPECIFIED WHETHER RECURRENT: ICD-10-CM

## 2023-03-08 DIAGNOSIS — N89.8 VAGINAL DRYNESS: Chronic | ICD-10-CM

## 2023-03-08 DIAGNOSIS — Z11.59 NEED FOR HEPATITIS C SCREENING TEST: Primary | ICD-10-CM

## 2023-03-08 LAB
ALBUMIN SERPL BCP-MCNC: 4.2 G/DL (ref 3.5–5)
ALBUMIN/GLOB SERPL: 1.6 {RATIO}
ALP SERPL-CCNC: 46 U/L (ref 55–142)
ALT SERPL W P-5'-P-CCNC: 15 U/L (ref 13–56)
ANION GAP SERPL CALCULATED.3IONS-SCNC: 7 MMOL/L (ref 7–16)
AST SERPL W P-5'-P-CCNC: 18 U/L (ref 15–37)
BASOPHILS # BLD AUTO: 0.04 K/UL (ref 0–0.2)
BASOPHILS NFR BLD AUTO: 0.9 % (ref 0–1)
BILIRUB SERPL-MCNC: 0.7 MG/DL (ref ?–1.2)
BUN SERPL-MCNC: 23 MG/DL (ref 7–18)
BUN/CREAT SERPL: 22 (ref 6–20)
CALCIUM SERPL-MCNC: 9.7 MG/DL (ref 8.5–10.1)
CHLORIDE SERPL-SCNC: 105 MMOL/L (ref 98–107)
CO2 SERPL-SCNC: 34 MMOL/L (ref 21–32)
CREAT SERPL-MCNC: 1.04 MG/DL (ref 0.55–1.02)
DIFFERENTIAL METHOD BLD: ABNORMAL
EGFR (NO RACE VARIABLE) (RUSH/TITUS): 55 ML/MIN/1.73M²
EOSINOPHIL # BLD AUTO: 0.1 K/UL (ref 0–0.5)
EOSINOPHIL NFR BLD AUTO: 2.1 % (ref 1–4)
ERYTHROCYTE [DISTWIDTH] IN BLOOD BY AUTOMATED COUNT: 13.3 % (ref 11.5–14.5)
GLOBULIN SER-MCNC: 2.6 G/DL (ref 2–4)
GLUCOSE SERPL-MCNC: 97 MG/DL (ref 74–106)
HCT VFR BLD AUTO: 40.6 % (ref 38–47)
HCV AB SER QL: NORMAL
HGB BLD-MCNC: 13.5 G/DL (ref 12–16)
IMM GRANULOCYTES # BLD AUTO: 0 K/UL (ref 0–0.04)
IMM GRANULOCYTES NFR BLD: 0 % (ref 0–0.4)
LYMPHOCYTES # BLD AUTO: 1.37 K/UL (ref 1–4.8)
LYMPHOCYTES NFR BLD AUTO: 29.4 % (ref 27–41)
MCH RBC QN AUTO: 30.9 PG (ref 27–31)
MCHC RBC AUTO-ENTMCNC: 33.3 G/DL (ref 32–36)
MCV RBC AUTO: 92.9 FL (ref 80–96)
MONOCYTES # BLD AUTO: 0.35 K/UL (ref 0–0.8)
MONOCYTES NFR BLD AUTO: 7.5 % (ref 2–6)
MPC BLD CALC-MCNC: 11 FL (ref 9.4–12.4)
NEUTROPHILS # BLD AUTO: 2.8 K/UL (ref 1.8–7.7)
NEUTROPHILS NFR BLD AUTO: 60.1 % (ref 53–65)
NRBC # BLD AUTO: 0 X10E3/UL
NRBC, AUTO (.00): 0 %
PLATELET # BLD AUTO: 143 K/UL (ref 150–400)
POTASSIUM SERPL-SCNC: 3.9 MMOL/L (ref 3.5–5.1)
PROT SERPL-MCNC: 6.8 G/DL (ref 6.4–8.2)
RBC # BLD AUTO: 4.37 M/UL (ref 4.2–5.4)
SODIUM SERPL-SCNC: 142 MMOL/L (ref 136–145)
WBC # BLD AUTO: 4.66 K/UL (ref 4.5–11)

## 2023-03-08 PROCEDURE — 80053 COMPREHEN METABOLIC PANEL: CPT | Mod: ,,, | Performed by: CLINICAL MEDICAL LABORATORY

## 2023-03-08 PROCEDURE — 99214 PR OFFICE/OUTPT VISIT, EST, LEVL IV, 30-39 MIN: ICD-10-PCS | Mod: GC,,, | Performed by: FAMILY MEDICINE

## 2023-03-08 PROCEDURE — 86803 HEPATITIS C AB TEST: CPT | Mod: ,,, | Performed by: CLINICAL MEDICAL LABORATORY

## 2023-03-08 PROCEDURE — 99214 OFFICE O/P EST MOD 30 MIN: CPT | Mod: GC,,, | Performed by: FAMILY MEDICINE

## 2023-03-08 PROCEDURE — 80053 COMPREHENSIVE METABOLIC PANEL: ICD-10-PCS | Mod: ,,, | Performed by: CLINICAL MEDICAL LABORATORY

## 2023-03-08 PROCEDURE — 85025 CBC WITH DIFFERENTIAL: ICD-10-PCS | Mod: ,,, | Performed by: CLINICAL MEDICAL LABORATORY

## 2023-03-08 PROCEDURE — 85025 COMPLETE CBC W/AUTO DIFF WBC: CPT | Mod: ,,, | Performed by: CLINICAL MEDICAL LABORATORY

## 2023-03-08 PROCEDURE — 86803 HEPATITIS C ANTIBODY: ICD-10-PCS | Mod: ,,, | Performed by: CLINICAL MEDICAL LABORATORY

## 2023-03-08 RX ORDER — BUPROPION HYDROCHLORIDE 150 MG/1
150 TABLET ORAL DAILY
Qty: 30 TABLET | Refills: 0 | Status: SHIPPED | OUTPATIENT
Start: 2023-03-08 | End: 2023-04-12 | Stop reason: DRUGHIGH

## 2023-03-08 RX ORDER — BUPROPION HYDROCHLORIDE 300 MG/1
300 TABLET ORAL DAILY
Qty: 30 TABLET | Refills: 11 | Status: SHIPPED | OUTPATIENT
Start: 2023-03-08 | End: 2023-04-12 | Stop reason: SDUPTHER

## 2023-03-08 NOTE — PROGRESS NOTES
Subjective:       Patient ID: Shelbi May is a 78 y.o. female.    Chief Complaint: Follow-up and Depression (Follow up)    77 yo female with PMH of CVA, HTN, DLD, CKD, thyroid nodule, MDD and BPPV presents for follow up c/o depression, vaginal dryness and abdominal pain x 2-3 weeks. Pain is intermittent, described as a dull ache. Denies radiation, correlation with food and BM, precipitating factors, relieving factor and aggravating factors, changes in BM and urination, abdominal surgery. Pain is gone now.         Current Outpatient Medications:     aspirin (ECOTRIN) 81 MG EC tablet, Take 81 mg by mouth once daily., Disp: , Rfl:     citalopram (CELEXA) 20 MG tablet, Take 1 tablet (20 mg total) by mouth once daily., Disp: 90 tablet, Rfl: 3    fluticasone (VERAMYST) 27.5 mcg/actuation nasal spray, 2 sprays by Nasal route once daily., Disp: , Rfl:     ipratropium (ATROVENT) 0.02 % nebulizer solution, Take 500 mcg by nebulization 3 (three) times daily as needed for Wheezing. Rescue, Disp: , Rfl:     loratadine (CLARITIN) 10 mg tablet, Take 10 mg by mouth once daily., Disp: , Rfl:     pravastatin (PRAVACHOL) 40 MG tablet, Take 1 tablet (40 mg total) by mouth once daily., Disp: 90 tablet, Rfl: 3    buPROPion (WELLBUTRIN XL) 150 MG TB24 tablet, Take 1 tablet (150 mg total) by mouth once daily., Disp: 30 tablet, Rfl: 0    buPROPion (WELLBUTRIN XL) 300 MG 24 hr tablet, Take 1 tablet (300 mg total) by mouth once daily., Disp: 30 tablet, Rfl: 11    ipratropium (ATROVENT) 21 mcg (0.03 %) nasal spray, 2 sprays by Nasal route 3 (three) times daily. (Patient not taking: Reported on 12/7/2022), Disp: 30 mL, Rfl: 0    Review of patient's allergies indicates:  No Known Allergies    Past Medical History:   Diagnosis Date    Cancer     colon cancer no treatment     Diverticula, colon 5/17/2021    History of colon cancer, stage I 5/17/2021    Stroke        Past Surgical History:   Procedure Laterality Date     TONSILLECTOMY         Family History   Problem Relation Age of Onset    Hypertension Mother     Heart disease Father     Cancer Brother        Social History     Tobacco Use    Smoking status: Never    Smokeless tobacco: Never   Substance Use Topics    Alcohol use: Never    Drug use: Never       Review of Systems   Constitutional:  Negative for chills, fatigue, fever and unexpected weight change.   HENT:  Negative for nasal congestion, hearing loss, postnasal drip and rhinorrhea.    Eyes:  Negative for visual disturbance.   Respiratory:  Negative for cough, chest tightness, shortness of breath, wheezing and stridor.    Cardiovascular:  Negative for chest pain, palpitations and leg swelling.   Gastrointestinal:  Positive for abdominal pain. Negative for blood in stool, change in bowel habit, constipation, diarrhea, nausea, vomiting, fecal incontinence and change in bowel habit.   Genitourinary:  Negative for decreased urine volume, difficulty urinating, dysuria and hematuria.   Musculoskeletal:  Negative for arthralgias and myalgias.   Integumentary:  Negative for rash.   Neurological:  Negative for dizziness, vertigo, syncope, weakness, light-headedness and headaches.   Psychiatric/Behavioral:  Positive for dysphoric mood and sleep disturbance. Negative for decreased concentration, hallucinations, self-injury and suicidal ideas. The patient is nervous/anxious.        Current Medications:   Medication List with Changes/Refills   New Medications    BUPROPION (WELLBUTRIN XL) 150 MG TB24 TABLET    Take 1 tablet (150 mg total) by mouth once daily.       Start Date: 3/8/2023  End Date: 3/7/2024    BUPROPION (WELLBUTRIN XL) 300 MG 24 HR TABLET    Take 1 tablet (300 mg total) by mouth once daily.       Start Date: 3/8/2023  End Date: 3/7/2024   Current Medications    ASPIRIN (ECOTRIN) 81 MG EC TABLET    Take 81 mg by mouth once daily.       Start Date: --        End Date: --    CITALOPRAM (CELEXA) 20 MG TABLET     "Take 1 tablet (20 mg total) by mouth once daily.       Start Date: 12/7/2022 End Date: --    FLUTICASONE (VERAMYST) 27.5 MCG/ACTUATION NASAL SPRAY    2 sprays by Nasal route once daily.       Start Date: --        End Date: --    IPRATROPIUM (ATROVENT) 0.02 % NEBULIZER SOLUTION    Take 500 mcg by nebulization 3 (three) times daily as needed for Wheezing. Rescue       Start Date: --        End Date: --    IPRATROPIUM (ATROVENT) 21 MCG (0.03 %) NASAL SPRAY    2 sprays by Nasal route 3 (three) times daily.       Start Date: 9/22/2021 End Date: --    LORATADINE (CLARITIN) 10 MG TABLET    Take 10 mg by mouth once daily.       Start Date: --        End Date: --    PRAVASTATIN (PRAVACHOL) 40 MG TABLET    Take 1 tablet (40 mg total) by mouth once daily.       Start Date: 12/7/2022 End Date: --            Objective:        Vitals:    03/08/23 1106   BP: 116/66   BP Location: Right arm   Patient Position: Sitting   BP Method: Medium (Automatic)   Pulse: 81   Resp: 20   Temp: 97.9 °F (36.6 °C)   TempSrc: Oral   SpO2: 97%   Weight: 56.9 kg (125 lb 6.4 oz)   Height: 5' 4" (1.626 m)       Physical Exam  Vitals reviewed.   Constitutional:       General: She is not in acute distress.     Appearance: Normal appearance. She is normal weight. She is not ill-appearing, toxic-appearing or diaphoretic.   HENT:      Head: Normocephalic and atraumatic.      Right Ear: Tympanic membrane, ear canal and external ear normal. There is no impacted cerumen.      Left Ear: Tympanic membrane, ear canal and external ear normal. There is no impacted cerumen.      Nose: No rhinorrhea.      Comments: erythematous and edematous nasal mucosa     Mouth/Throat:      Mouth: Mucous membranes are moist.   Eyes:      Extraocular Movements: Extraocular movements intact.      Pupils: Pupils are equal, round, and reactive to light.      Comments: nystagmus with lateral gaze B/L   Neck:      Vascular: No carotid bruit.      Comments: R carotid " bruit  Cardiovascular:      Rate and Rhythm: Normal rate and regular rhythm.      Pulses: Normal pulses.      Heart sounds: Normal heart sounds. No murmur heard.    No friction rub. No gallop.   Pulmonary:      Effort: Pulmonary effort is normal. No respiratory distress.      Breath sounds: Normal breath sounds. No wheezing, rhonchi or rales.   Abdominal:      General: Bowel sounds are normal. There is no distension.      Palpations: Abdomen is soft. There is no mass.      Tenderness: There is no abdominal tenderness. There is no guarding or rebound.   Musculoskeletal:      Cervical back: No rigidity or tenderness.      Right lower leg: No edema.      Left lower leg: No edema.   Skin:     General: Skin is warm and dry.      Capillary Refill: Capillary refill takes less than 2 seconds.   Neurological:      General: No focal deficit present.      Mental Status: She is alert and oriented to person, place, and time.      Sensory: No sensory deficit.      Motor: No weakness.      Deep Tendon Reflexes: Reflexes normal.   Psychiatric:         Mood and Affect: Mood normal.         Behavior: Behavior normal.         Thought Content: Thought content normal.         Judgment: Judgment normal.           Lab Results   Component Value Date    WBC 4.70 07/22/2021    HGB 13.3 07/22/2021    HCT 40.0 07/22/2021     (L) 07/22/2021    ALT 24 07/22/2021    AST 21 07/22/2021     07/22/2021    K 4.1 07/22/2021     07/22/2021    CREATININE 0.97 07/22/2021    BUN 19 (H) 07/22/2021    CO2 32 07/22/2021      Assessment:       1. Need for hepatitis C screening test    2. Major depressive disorder in partial remission, unspecified whether recurrent    3. Vaginal dryness          Plan:         Problem List Items Addressed This Visit          Psychiatric    Depression     - PHQ9: 10, GAD7: 4  - 2005: Started Wellbutrin  - 2014: Night terrors started  - 2014/2015: Stopped Wellbutrin  - [id number]: Tried a few different mood  medications that did not control mood and night terrors continued  - 9/8/21: Started Celexa 20 mg. Night terrors still present but at a decreased intenisty and occurred less often. Initially Celexa was controlling mood well but now not controlling mood. Wellbutrin worked the best.   - Likely night terrors are not 2/2 Wellbutrin. Will address mood on thsi visit and then night terrors at the next visit.   - Decrease Celexa to 10 mg x 1 week. Then stop after one work.   - Start Wellbutrin  mg QD x 4 days then 300 mg after               Relevant Medications    buPROPion (WELLBUTRIN XL) 150 MG TB24 tablet    buPROPion (WELLBUTRIN XL) 300 MG 24 hr tablet    Other Relevant Orders    Comprehensive Metabolic Panel    CBC Auto Differential       Renal/    Vaginal dryness (Chronic)     - Start lubricant and moisturizers   - Will consider estrogen cream if fail           Other Visit Diagnoses       Need for hepatitis C screening test    -  Primary    Relevant Orders    Hepatitis C Antibody              No follow-ups on file.    Cortney Gutierrez DO     Instructed patient that if symptoms fail to improve or worsen patient should seek immediate medical attention or report to the nearest emergency department. Patient expressed verbal agreement and understanding to this plan of care.

## 2023-03-08 NOTE — ASSESSMENT & PLAN NOTE
- PHQ9: 10, GAD7: 4  - 2005: Started Wellbutrin  - 2014: Night terrors started  - 2014/2015: Stopped Wellbutrin  - [id number]: Tried a few different mood medications that did not control mood and night terrors continued  - 9/8/21: Started Celexa 20 mg. Night terrors still present but at a decreased intenisty and occurred less often. Initially Celexa was controlling mood well but now not controlling mood. Wellbutrin worked the best.   - Likely night terrors are not 2/2 Wellbutrin. Will address mood on thsi visit and then night terrors at the next visit.   - Decrease Celexa to 10 mg x 1 week. Then stop after one work.   - Start Wellbutrin  mg QD x 4 days then 300 mg after

## 2023-03-17 NOTE — PROGRESS NOTES
Cr 1.04 and GFR 55, which may be due dehydration. Platelet is low normal but otherwise CMP and CBC are unremarkable. Results discussed with patient. She reports that she is doing well on the Wellbutrin but has cut her dose down to 150 mg 2/2 feeling jittery. No night terrors since.

## 2023-04-12 ENCOUNTER — OFFICE VISIT (OUTPATIENT)
Dept: FAMILY MEDICINE | Facility: CLINIC | Age: 78
End: 2023-04-12
Payer: MEDICARE

## 2023-04-12 VITALS
RESPIRATION RATE: 16 BRPM | WEIGHT: 125 LBS | HEIGHT: 64 IN | HEART RATE: 65 BPM | BODY MASS INDEX: 21.34 KG/M2 | OXYGEN SATURATION: 98 % | SYSTOLIC BLOOD PRESSURE: 129 MMHG | TEMPERATURE: 98 F | DIASTOLIC BLOOD PRESSURE: 61 MMHG

## 2023-04-12 DIAGNOSIS — F32.4 MAJOR DEPRESSIVE DISORDER IN PARTIAL REMISSION, UNSPECIFIED WHETHER RECURRENT: Primary | ICD-10-CM

## 2023-04-12 DIAGNOSIS — J06.9 VIRAL UPPER RESPIRATORY TRACT INFECTION: ICD-10-CM

## 2023-04-12 PROCEDURE — 99213 PR OFFICE/OUTPT VISIT, EST, LEVL III, 20-29 MIN: ICD-10-PCS | Mod: ,,, | Performed by: FAMILY MEDICINE

## 2023-04-12 PROCEDURE — 99213 OFFICE O/P EST LOW 20 MIN: CPT | Mod: ,,, | Performed by: FAMILY MEDICINE

## 2023-04-12 RX ORDER — FLUTICASONE PROPIONATE 50 MCG
1 SPRAY, SUSPENSION (ML) NASAL DAILY
COMMUNITY
End: 2024-03-28 | Stop reason: SDUPTHER

## 2023-04-12 RX ORDER — MECLIZINE HYDROCHLORIDE 25 MG/1
25 TABLET ORAL 3 TIMES DAILY PRN
Qty: 90 TABLET | Refills: 0 | Status: SHIPPED | OUTPATIENT
Start: 2023-04-12 | End: 2024-01-05 | Stop reason: SDUPTHER

## 2023-04-12 RX ORDER — BUPROPION HYDROCHLORIDE 300 MG/1
300 TABLET ORAL DAILY
Qty: 30 TABLET | Refills: 11 | Status: SHIPPED | OUTPATIENT
Start: 2023-04-12 | End: 2024-01-05 | Stop reason: SDUPTHER

## 2023-04-12 NOTE — ASSESSMENT & PLAN NOTE
- Nasal congestion, rhinorrhea, hoarseness x 1.5 weeks, making her dizziness worse. No fevers or chills.   - Refill Meclizine   - RTC as needed

## 2023-04-12 NOTE — ASSESSMENT & PLAN NOTE
- PHQ9: 5 (10 on last visit), GAD7: 2 (4 on last visit). Feels that it is well controlled enough now and wants to continue on current Wellbutrin 300 mg dosage. Night terrors improved.   - 2005: Started Wellbutrin  - 2014: Night terrors started  - 2014/2015: Stopped Wellbutrin  - [id number]: Tried a few different mood medications that did not control mood and night terrors continued  - 9/8/21: Started Celexa 20 mg. Night terrors still present but at a decreased intenisty and occurred less often. Initially Celexa was controlling mood well but now not controlling mood. Wellbutrin worked the best.   - Likely night terrors are not 2/2 Wellbutrin.   - F/U 1 month.

## 2023-04-12 NOTE — PROGRESS NOTES
Subjective:       Patient ID: Shelbi May is a 78 y.o. female.    Chief Complaint: Depression (Improving with med adjustment)    77 yo female with PMH of CVA, HTN, DLD, CKD, thyroid nodule, MDD and BPPV presents for follow up on depression. Reports significant improvement. See assessment and plan for details.       Current Outpatient Medications:     aspirin (ECOTRIN) 81 MG EC tablet, Take 81 mg by mouth once daily., Disp: , Rfl:     fluticasone propionate (FLONASE) 50 mcg/actuation nasal spray, 1 spray by Each Nostril route once daily., Disp: , Rfl:     loratadine (CLARITIN) 10 mg tablet, Take 10 mg by mouth once daily., Disp: , Rfl:     pravastatin (PRAVACHOL) 40 MG tablet, Take 1 tablet (40 mg total) by mouth once daily., Disp: 90 tablet, Rfl: 3    buPROPion (WELLBUTRIN XL) 300 MG 24 hr tablet, Take 1 tablet (300 mg total) by mouth once daily., Disp: 30 tablet, Rfl: 11    meclizine (ANTIVERT) 25 mg tablet, Take 1 tablet (25 mg total) by mouth 3 (three) times daily as needed for Dizziness., Disp: 90 tablet, Rfl: 0    Review of patient's allergies indicates:  No Known Allergies    Past Medical History:   Diagnosis Date    Allergy     Cancer     colon cancer no treatment     Depression     Diverticula, colon 05/17/2021    History of colon cancer, stage I 05/17/2021    Hyperlipidemia     Stroke        Past Surgical History:   Procedure Laterality Date    TONSILLECTOMY         Family History   Problem Relation Age of Onset    Hypertension Mother     Heart disease Father     Cancer Brother        Social History     Tobacco Use    Smoking status: Never    Smokeless tobacco: Never   Substance Use Topics    Alcohol use: Never    Drug use: Never       Review of Systems   Constitutional:  Negative for chills, fatigue, fever and unexpected weight change.   HENT:  Positive for nasal congestion, rhinorrhea and voice change. Negative for hearing loss.    Eyes:  Negative for visual disturbance.    Respiratory:  Negative for cough, chest tightness, shortness of breath, wheezing and stridor.    Cardiovascular:  Negative for chest pain, palpitations and leg swelling.   Gastrointestinal:  Negative for abdominal pain, blood in stool, change in bowel habit, constipation, diarrhea, nausea, vomiting, fecal incontinence and change in bowel habit.   Genitourinary:  Negative for decreased urine volume, difficulty urinating, dysuria and hematuria.   Musculoskeletal:  Negative for arthralgias and myalgias.   Integumentary:  Negative for rash.   Neurological:  Negative for dizziness, vertigo, syncope, weakness, light-headedness and headaches.   Psychiatric/Behavioral:  Positive for dysphoric mood and sleep disturbance. Negative for decreased concentration, hallucinations, self-injury and suicidal ideas. The patient is nervous/anxious.        Current Medications:   Medication List with Changes/Refills   New Medications    MECLIZINE (ANTIVERT) 25 MG TABLET    Take 1 tablet (25 mg total) by mouth 3 (three) times daily as needed for Dizziness.       Start Date: 4/12/2023 End Date: --   Current Medications    ASPIRIN (ECOTRIN) 81 MG EC TABLET    Take 81 mg by mouth once daily.       Start Date: --        End Date: --    FLUTICASONE PROPIONATE (FLONASE) 50 MCG/ACTUATION NASAL SPRAY    1 spray by Each Nostril route once daily.       Start Date: --        End Date: --    LORATADINE (CLARITIN) 10 MG TABLET    Take 10 mg by mouth once daily.       Start Date: --        End Date: --    PRAVASTATIN (PRAVACHOL) 40 MG TABLET    Take 1 tablet (40 mg total) by mouth once daily.       Start Date: 12/7/2022 End Date: --   Changed and/or Refilled Medications    Modified Medication Previous Medication    BUPROPION (WELLBUTRIN XL) 300 MG 24 HR TABLET buPROPion (WELLBUTRIN XL) 300 MG 24 hr tablet       Take 1 tablet (300 mg total) by mouth once daily.    Take 1 tablet (300 mg total) by mouth once daily.       Start Date: 4/12/2023 End Date:  "4/11/2024    Start Date: 3/8/2023  End Date: 4/12/2023   Discontinued Medications    BUPROPION (WELLBUTRIN XL) 150 MG TB24 TABLET    Take 1 tablet (150 mg total) by mouth once daily.       Start Date: 3/8/2023  End Date: 4/12/2023    CITALOPRAM (CELEXA) 20 MG TABLET    Take 1 tablet (20 mg total) by mouth once daily.       Start Date: 12/7/2022 End Date: 4/12/2023    FLUTICASONE (VERAMYST) 27.5 MCG/ACTUATION NASAL SPRAY    2 sprays by Nasal route once daily.       Start Date: --        End Date: 4/12/2023    IPRATROPIUM (ATROVENT) 0.02 % NEBULIZER SOLUTION    Take 500 mcg by nebulization 3 (three) times daily as needed for Wheezing. Rescue       Start Date: --        End Date: 4/12/2023    IPRATROPIUM (ATROVENT) 21 MCG (0.03 %) NASAL SPRAY    2 sprays by Nasal route 3 (three) times daily.       Start Date: 9/22/2021 End Date: 4/12/2023            Objective:        Vitals:    04/12/23 1313   BP: 129/61   BP Location: Left arm   Patient Position: Sitting   BP Method: Medium (Automatic)   Pulse: 65   Resp: 16   Temp: 98 °F (36.7 °C)   TempSrc: Oral   SpO2: 98%   Weight: 56.7 kg (125 lb)   Height: 5' 4" (1.626 m)       Physical Exam  Vitals reviewed.   Constitutional:       General: She is not in acute distress.     Appearance: Normal appearance. She is normal weight. She is not ill-appearing, toxic-appearing or diaphoretic.   HENT:      Head: Normocephalic and atraumatic.      Right Ear: Tympanic membrane normal.      Left Ear: Tympanic membrane normal.      Nose: Nose normal. No rhinorrhea.      Comments: erythematous and edematous nasal mucosa     Mouth/Throat:      Mouth: Mucous membranes are moist.   Eyes:      Extraocular Movements: Extraocular movements intact.      Pupils: Pupils are equal, round, and reactive to light.      Comments: nystagmus with lateral gaze B/L   Neck:      Vascular: No carotid bruit.      Comments: R carotid bruit  Cardiovascular:      Rate and Rhythm: Normal rate and regular rhythm.      " Pulses: Normal pulses.      Heart sounds: Normal heart sounds. No murmur heard.    No friction rub. No gallop.   Pulmonary:      Effort: Pulmonary effort is normal. No respiratory distress.      Breath sounds: Normal breath sounds. No wheezing, rhonchi or rales.   Abdominal:      General: Bowel sounds are normal. There is no distension.      Palpations: Abdomen is soft. There is no mass.      Tenderness: There is no abdominal tenderness. There is no guarding or rebound.   Musculoskeletal:      Cervical back: No rigidity or tenderness.      Right lower leg: No edema.      Left lower leg: No edema.   Skin:     General: Skin is warm and dry.      Capillary Refill: Capillary refill takes less than 2 seconds.   Neurological:      General: No focal deficit present.      Mental Status: She is alert and oriented to person, place, and time.      Sensory: No sensory deficit.      Motor: No weakness.      Deep Tendon Reflexes: Reflexes normal.   Psychiatric:         Mood and Affect: Mood normal.         Behavior: Behavior normal.         Thought Content: Thought content normal.         Judgment: Judgment normal.           Lab Results   Component Value Date    WBC 4.66 03/08/2023    HGB 13.5 03/08/2023    HCT 40.6 03/08/2023     (L) 03/08/2023    ALT 15 03/08/2023    AST 18 03/08/2023     03/08/2023    K 3.9 03/08/2023     03/08/2023    CREATININE 1.04 (H) 03/08/2023    BUN 23 (H) 03/08/2023    CO2 34 (H) 03/08/2023      Assessment:       1. Major depressive disorder in partial remission, unspecified whether recurrent    2. Viral upper respiratory tract infection        Plan:         Problem List Items Addressed This Visit          Psychiatric    Depression - Primary     - PHQ9: 5 (10 on last visit), GAD7: 2 (4 on last visit). Feels that it is well controlled enough now and wants to continue on current Wellbutrin 300 mg dosage. Night terrors improved.   - 2005: Started Wellbutrin  - 2014: Night terrors  started  - 2014/2015: Stopped Wellbutrin  - [id number]: Tried a few different mood medications that did not control mood and night terrors continued  - 9/8/21: Started Celexa 20 mg. Night terrors still present but at a decreased intenisty and occurred less often. Initially Celexa was controlling mood well but now not controlling mood. Wellbutrin worked the best.   - Likely night terrors are not 2/2 Wellbutrin.   - F/U 1 month.                 Relevant Medications    buPROPion (WELLBUTRIN XL) 300 MG 24 hr tablet       ENT    Viral upper respiratory tract infection     - Nasal congestion, rhinorrhea, hoarseness x 1.5 weeks, making her dizziness worse. No fevers or chills.   - Refill Meclizine   - RTC as needed             Relevant Medications    meclizine (ANTIVERT) 25 mg tablet         No follow-ups on file.    Cortney Gutierrez DO     Instructed patient that if symptoms fail to improve or worsen patient should seek immediate medical attention or report to the nearest emergency department. Patient expressed verbal agreement and understanding to this plan of care.

## 2023-05-09 DIAGNOSIS — Z71.89 COMPLEX CARE COORDINATION: ICD-10-CM

## 2023-12-09 DIAGNOSIS — Z71.89 COMPLEX CARE COORDINATION: ICD-10-CM

## 2024-01-05 ENCOUNTER — OFFICE VISIT (OUTPATIENT)
Dept: FAMILY MEDICINE | Facility: CLINIC | Age: 79
End: 2024-01-05
Payer: MEDICARE

## 2024-01-05 VITALS
OXYGEN SATURATION: 97 % | HEART RATE: 72 BPM | WEIGHT: 121 LBS | RESPIRATION RATE: 20 BRPM | HEIGHT: 64 IN | TEMPERATURE: 98 F | SYSTOLIC BLOOD PRESSURE: 137 MMHG | DIASTOLIC BLOOD PRESSURE: 76 MMHG | BODY MASS INDEX: 20.66 KG/M2

## 2024-01-05 DIAGNOSIS — J06.9 VIRAL UPPER RESPIRATORY TRACT INFECTION: ICD-10-CM

## 2024-01-05 DIAGNOSIS — F41.9 ANXIETY: ICD-10-CM

## 2024-01-05 DIAGNOSIS — H81.10 BPPV (BENIGN PAROXYSMAL POSITIONAL VERTIGO), UNSPECIFIED LATERALITY: ICD-10-CM

## 2024-01-05 DIAGNOSIS — F32.4 MAJOR DEPRESSIVE DISORDER IN PARTIAL REMISSION, UNSPECIFIED WHETHER RECURRENT: Primary | ICD-10-CM

## 2024-01-05 DIAGNOSIS — E78.2 MIXED HYPERLIPIDEMIA: ICD-10-CM

## 2024-01-05 PROCEDURE — 99213 OFFICE O/P EST LOW 20 MIN: CPT | Mod: GC,,, | Performed by: FAMILY MEDICINE

## 2024-01-05 RX ORDER — SERTRALINE HYDROCHLORIDE 25 MG/1
50 TABLET, FILM COATED ORAL DAILY
Qty: 60 TABLET | Refills: 0 | Status: SHIPPED | OUTPATIENT
Start: 2024-01-05 | End: 2024-02-26

## 2024-01-05 RX ORDER — BUPROPION HYDROCHLORIDE 300 MG/1
300 TABLET ORAL DAILY
Qty: 90 TABLET | Refills: 3 | Status: SHIPPED | OUTPATIENT
Start: 2024-01-05 | End: 2025-01-04

## 2024-01-05 RX ORDER — PRAVASTATIN SODIUM 40 MG/1
40 TABLET ORAL DAILY
Qty: 90 TABLET | Refills: 3 | Status: SHIPPED | OUTPATIENT
Start: 2024-01-05

## 2024-01-05 RX ORDER — MECLIZINE HYDROCHLORIDE 25 MG/1
25 TABLET ORAL 3 TIMES DAILY PRN
Qty: 90 TABLET | Refills: 0 | Status: SHIPPED | OUTPATIENT
Start: 2024-01-05

## 2024-01-05 NOTE — ASSESSMENT & PLAN NOTE
- Sertraline (Zoloft) 25 mg for first 4 days then 50 mg afterwards.  - monitor the symptoms.  - follow up in 30 days  - follow up earlier if symptoms worsen or new symptoms occur

## 2024-01-05 NOTE — PROGRESS NOTES
Fabi Hays MD MPH  [address], [place] [address]  Phone: [phone]     Subjective     Name: Shelbi May   YOB: 1945 (78 y.o.)  MRN: [id number]  Visit Date: 1/5/2024   Chief Complaint: Medication Refill (Room 4 med refill)        HISTORY OF PRESENT ILLNESS:  Patient is 77 yo female with PMH of HLD, BPPV, Depression, and anxiety. She came to the clinic for a walk in appointment to get the prescriptions refilled. She was not in any acute distress during the encounter. She has been compliant with her medications and dietary restrictions. She reported that her depression symptoms are well controlled with Wellbutrin but her anxiety is getting worse. She is going through life events that might provoke anxiety more than usual. She saus that anxiety episodes are accompanied by shortness of breath and palpitations. She denies any chest pain, dizziness, or LOC.  She denied any suicidal/homicidal ideation.         PAST MEDICAL HISTORY:  Significant Diagnoses - Patient  has a past medical history of Allergy, Cancer, Depression, Diverticula, colon (05/17/2021), History of colon cancer, stage I (05/17/2021), Hyperlipidemia, and Stroke.  Medications - Patient has a current medication list which includes the following long-term medication(s): aspirin, bupropion, loratadine, meclizine, pravastatin, and sertraline.   Allergies - Patient has No Known Allergies.  Surgeries - Patient  has a past surgical history that includes Tonsillectomy.  Family History - Patient family history includes Cancer in her brother; Heart disease in her father; Hypertension in her mother.      SOCIAL HISTORY:  Tobacco - Patient  reports that she has never smoked. She has never used smokeless tobacco.   Alcohol - Patient  reports no history of alcohol use.   Recreational Drugs - Patient  reports no history of drug use.       Review of Systems   Constitutional: Negative.    HENT: Negative.     Eyes:  Negative for  "visual disturbance.   Respiratory:  Positive for shortness of breath. Negative for cough, chest tightness and wheezing.    Cardiovascular:  Negative for chest pain, palpitations, leg swelling and claudication.   Gastrointestinal:  Negative for abdominal distention, abdominal pain, change in bowel habit, diarrhea, nausea and reflux.   Endocrine: Negative.    Genitourinary: Negative.    Neurological:  Positive for vertigo. Negative for dizziness, seizures and headaches.   Psychiatric/Behavioral:  Negative for confusion, decreased concentration, sleep disturbance and suicidal ideas. The patient is nervous/anxious.           Past Medical History:   Diagnosis Date    Allergy     Cancer     colon cancer no treatment     Depression     Diverticula, colon 05/17/2021    History of colon cancer, stage I 05/17/2021    Hyperlipidemia     Stroke         Review of patient's allergies indicates:  No Known Allergies     Past Surgical History:   Procedure Laterality Date    TONSILLECTOMY          Family History   Problem Relation Age of Onset    Hypertension Mother     Heart disease Father     Cancer Brother        Current Outpatient Medications   Medication Instructions    aspirin (ECOTRIN) 81 mg, Oral, Daily    buPROPion (WELLBUTRIN XL) 300 mg, Oral, Daily    fluticasone propionate (FLONASE) 50 mcg/actuation nasal spray 1 spray, Each Nostril, Daily    loratadine (CLARITIN) 10 mg, Oral, Daily    meclizine (ANTIVERT) 25 mg, Oral, 3 times daily PRN    pravastatin (PRAVACHOL) 40 mg, Oral, Daily    sertraline (ZOLOFT) 50 mg, Oral, Daily        Objective     /76 (BP Location: Left arm, Patient Position: Sitting, BP Method: Medium (Automatic))   Pulse 72   Temp 98.1 °F (36.7 °C) (Oral)   Resp 20   Ht 5' 4" (1.626 m)   Wt 54.9 kg (121 lb)   SpO2 97%   BMI 20.77 kg/m²     Physical Exam  Vitals and nursing note reviewed.   Constitutional:       Appearance: Normal appearance. She is normal weight.   HENT:      Head: " Normocephalic and atraumatic.      Right Ear: Tympanic membrane, ear canal and external ear normal.      Left Ear: Tympanic membrane, ear canal and external ear normal.      Nose: Nose normal.      Mouth/Throat:      Mouth: Mucous membranes are moist.   Eyes:      Extraocular Movements: Extraocular movements intact.      Conjunctiva/sclera: Conjunctivae normal.      Pupils: Pupils are equal, round, and reactive to light.   Cardiovascular:      Rate and Rhythm: Normal rate and regular rhythm.      Pulses: Normal pulses.      Heart sounds: Normal heart sounds.   Pulmonary:      Effort: Pulmonary effort is normal. No respiratory distress.      Breath sounds: Normal breath sounds. No wheezing.   Chest:      Chest wall: No tenderness.   Abdominal:      General: Bowel sounds are normal.      Palpations: Abdomen is soft.   Musculoskeletal:         General: Normal range of motion.      Cervical back: Normal range of motion and neck supple.      Right lower leg: No edema.      Left lower leg: No edema.   Skin:     General: Skin is warm.   Neurological:      General: No focal deficit present.      Mental Status: She is alert and oriented to person, place, and time.   Psychiatric:         Mood and Affect: Mood normal.          All recently obtained labs have been reviewed and discussed in detail with the patient.   Assessment     1. Major depressive disorder in partial remission, unspecified whether recurrent    2. Mixed hyperlipidemia    3. Viral upper respiratory tract infection    4. BPPV (benign paroxysmal positional vertigo), unspecified laterality    5. Anxiety         Plan     Problem List Items Addressed This Visit          Psychiatric    Depression - Primary    Relevant Medications    buPROPion (WELLBUTRIN XL) 300 MG 24 hr tablet    Anxiety     - Sertraline (Zoloft) 25 mg for first 4 days then 50 mg afterwards.  - monitor the symptoms.  - follow up in 30 days  - follow up earlier if symptoms worsen or new symptoms  occur         Relevant Medications    sertraline (ZOLOFT) 25 MG tablet       ENT    BPPV (benign paroxysmal positional vertigo), unspecified laterality     - continue antivert 25 mg for the symptomatic control.         Relevant Medications    meclizine (ANTIVERT) 25 mg tablet    Viral upper respiratory tract infection       Cardiac/Vascular    Hyperlipidemia     - maintain compliance with daily prescribed medications.  - follow dietary restrictions.         Relevant Medications    pravastatin (PRAVACHOL) 40 MG tablet         All orders:  Orders Placed This Encounter    pravastatin (PRAVACHOL) 40 MG tablet    meclizine (ANTIVERT) 25 mg tablet    buPROPion (WELLBUTRIN XL) 300 MG 24 hr tablet    sertraline (ZOLOFT) 25 MG tablet          Follow up in about 1 month (around 2/5/2024), or with Sloan Caicedo for anxiety assesment.    Fabi Hays MD MPH   [employer]

## 2024-02-26 ENCOUNTER — OFFICE VISIT (OUTPATIENT)
Dept: FAMILY MEDICINE | Facility: CLINIC | Age: 79
End: 2024-02-26
Payer: MEDICARE

## 2024-02-26 VITALS
WEIGHT: 118 LBS | BODY MASS INDEX: 20.14 KG/M2 | TEMPERATURE: 98 F | RESPIRATION RATE: 18 BRPM | HEART RATE: 78 BPM | SYSTOLIC BLOOD PRESSURE: 134 MMHG | HEIGHT: 64 IN | OXYGEN SATURATION: 99 % | DIASTOLIC BLOOD PRESSURE: 78 MMHG

## 2024-02-26 DIAGNOSIS — F41.9 ANXIETY: Primary | ICD-10-CM

## 2024-02-26 DIAGNOSIS — Z13.820 SCREENING FOR OSTEOPOROSIS: ICD-10-CM

## 2024-02-26 DIAGNOSIS — Z23 NEED FOR SHINGLES VACCINE: ICD-10-CM

## 2024-02-26 DIAGNOSIS — M81.0 AGE-RELATED OSTEOPOROSIS WITHOUT CURRENT PATHOLOGICAL FRACTURE: ICD-10-CM

## 2024-02-26 DIAGNOSIS — E04.2 MULTIPLE THYROID NODULES: ICD-10-CM

## 2024-02-26 LAB — TSH SERPL DL<=0.005 MIU/L-ACNC: 1.95 UIU/ML (ref 0.36–3.74)

## 2024-02-26 PROCEDURE — 90471 IMMUNIZATION ADMIN: CPT | Mod: GC,,, | Performed by: SPECIALIST

## 2024-02-26 PROCEDURE — 90750 HZV VACC RECOMBINANT IM: CPT | Mod: GC,,, | Performed by: SPECIALIST

## 2024-02-26 PROCEDURE — 84443 ASSAY THYROID STIM HORMONE: CPT | Mod: ,,, | Performed by: CLINICAL MEDICAL LABORATORY

## 2024-02-26 PROCEDURE — 99214 OFFICE O/P EST MOD 30 MIN: CPT | Mod: GC,,, | Performed by: SPECIALIST

## 2024-02-26 RX ORDER — ESCITALOPRAM OXALATE 5 MG/1
5 TABLET ORAL DAILY
Qty: 30 TABLET | Refills: 0 | Status: SHIPPED | OUTPATIENT
Start: 2024-02-26 | End: 2024-03-28

## 2024-02-26 NOTE — ASSESSMENT & PLAN NOTE
- monitor for worsening of symptoms  - stopped sertraline  - Started Escitalopram 5 mg QD  - follow up in 30 days  - follow up earlier if symptoms worsen or new symptoms occur

## 2024-02-26 NOTE — ASSESSMENT & PLAN NOTE
- monitor size of the nodules  - monitor for symptoms of weight loss, palpitations, chest pain, SOB, weight gain, heat/cold intolerance, trouble swallowing, hoarseness of voice.  - TSH ordered  - Thyroid U/S ordered  - follow up in a month

## 2024-02-26 NOTE — PROGRESS NOTES
Fabi Hays MD MPH  [address], [place] [address]  Phone: [phone]     Subjective     Name: Shelbi May   YOB: 1945 (79 y.o.)  MRN: [id number]  Visit Date: 2/26/2024   Chief Complaint: Follow-up (F/u no c/o)        HISTORY OF PRESENT ILLNESS:  Patient is 79 yo female with PMH of HLD, BPPV, Depression, and anxiety. She came to the clinic for a follow up appointment. She was not in any acute distress during the encounter. She has been compliant with her medications and dietary restrictions. She reported that her depression symptoms are well controlled with Wellbutrin but her anxiety is getting worse. Patient's anxiety has been under control except for a few stress provoked SOB episodes. She was prescribed with Sertraline in her last appointment but she started experiencing tremors being on it. She took it for 5 days and stopped. She says that anxiety episodes are accompanied by shortness of breath and palpitations. She denies any chest pain, dizziness, or LOC.  She denied any suicidal/homicidal ideation.             PAST MEDICAL HISTORY:  Significant Diagnoses - Patient  has a past medical history of Allergy, Cancer, Depression, Diverticula, colon (05/17/2021), History of colon cancer, stage I (05/17/2021), Hyperlipidemia, and Stroke.  Medications - Patient has a current medication list which includes the following long-term medication(s): bupropion, loratadine, meclizine, pravastatin, aspirin, and escitalopram oxalate.   Allergies - Patient has No Known Allergies.  Surgeries - Patient  has a past surgical history that includes Tonsillectomy.  Family History - Patient family history includes Cancer in her brother; Heart disease in her father; Hypertension in her mother.      SOCIAL HISTORY:  Tobacco - Patient  reports that she has never smoked. She has never used smokeless tobacco.   Alcohol - Patient  reports no history of alcohol use.   Recreational Drugs - Patient   reports no history of drug use.       Review of Systems   Constitutional:  Positive for unexpected weight change. Negative for activity change, appetite change, chills, fatigue and fever.   HENT:  Negative for nasal congestion, ear discharge, ear pain, hearing loss, postnasal drip, rhinorrhea, sinus pressure/congestion and sore throat.    Eyes:  Negative for discharge, itching and visual disturbance.   Respiratory:  Negative for cough, choking, chest tightness, shortness of breath and wheezing.    Cardiovascular:  Negative for chest pain, palpitations, leg swelling and claudication.   Gastrointestinal:  Negative for abdominal distention, abdominal pain, constipation, diarrhea, nausea, vomiting and reflux.   Genitourinary:  Negative for difficulty urinating, dysuria, frequency, hematuria and urgency.   Musculoskeletal:  Negative for arthralgias, joint swelling and myalgias.   Integumentary:  Negative for rash.   Neurological:  Negative for tremors, light-headedness, numbness and headaches.   Psychiatric/Behavioral:  Positive for agitation. The patient is nervous/anxious.           Past Medical History:   Diagnosis Date    Allergy     Cancer     colon cancer no treatment     Depression     Diverticula, colon 05/17/2021    History of colon cancer, stage I 05/17/2021    Hyperlipidemia     Stroke         Review of patient's allergies indicates:  No Known Allergies     Past Surgical History:   Procedure Laterality Date    TONSILLECTOMY          Family History   Problem Relation Age of Onset    Hypertension Mother     Heart disease Father     Cancer Brother        Current Outpatient Medications   Medication Instructions    aspirin (ECOTRIN) 81 mg, Oral, Daily    buPROPion (WELLBUTRIN XL) 300 mg, Oral, Daily    EScitalopram oxalate (LEXAPRO) 5 mg, Oral, Daily    fluticasone propionate (FLONASE) 50 mcg/actuation nasal spray 1 spray, Each Nostril, Daily    loratadine (CLARITIN) 10 mg, Oral, Daily    meclizine (ANTIVERT) 25  "mg, Oral, 3 times daily PRN    pravastatin (PRAVACHOL) 40 mg, Oral, Daily        Objective     /78 (BP Location: Left arm, Patient Position: Sitting, BP Method: Medium (Automatic))   Pulse 78   Temp 97.9 °F (36.6 °C) (Oral)   Resp 18   Ht 5' 4" (1.626 m)   Wt 53.5 kg (118 lb)   SpO2 99%   BMI 20.25 kg/m²     Physical Exam  Vitals and nursing note reviewed.   Constitutional:       Appearance: Normal appearance. She is normal weight.   HENT:      Head: Normocephalic and atraumatic.      Right Ear: Tympanic membrane and ear canal normal.      Left Ear: Tympanic membrane and ear canal normal.      Nose: Nose normal.      Mouth/Throat:      Mouth: Mucous membranes are moist.      Pharynx: Oropharynx is clear.   Eyes:      Extraocular Movements: Extraocular movements intact.      Conjunctiva/sclera: Conjunctivae normal.      Pupils: Pupils are equal, round, and reactive to light.   Neck:        Comments: Thyroid nodules  Cardiovascular:      Rate and Rhythm: Normal rate and regular rhythm.      Pulses: Normal pulses.      Heart sounds: Normal heart sounds. No murmur heard.  Pulmonary:      Effort: Pulmonary effort is normal. No respiratory distress.      Breath sounds: Normal breath sounds. No wheezing.   Abdominal:      General: Bowel sounds are normal. There is no distension.      Palpations: Abdomen is soft.      Tenderness: There is no abdominal tenderness. There is no guarding.   Musculoskeletal:      Cervical back: Normal range of motion and neck supple.      Right lower leg: No edema.      Left lower leg: No edema.   Lymphadenopathy:      Cervical:      Right cervical: No superficial, deep or posterior cervical adenopathy.     Left cervical: No superficial, deep or posterior cervical adenopathy.   Skin:     Capillary Refill: Capillary refill takes less than 2 seconds.   Neurological:      General: No focal deficit present.      Mental Status: She is alert and oriented to person, place, and time. Mental " status is at baseline.   Psychiatric:         Mood and Affect: Mood normal.         Behavior: Behavior normal.         Thought Content: Thought content normal.         Judgment: Judgment normal.          All recently obtained labs have been reviewed and discussed in detail with the patient.   Assessment     1. Anxiety    2. Screening for osteoporosis    3. Need for shingles vaccine    4. Multiple thyroid nodules    5. Age-related osteoporosis without current pathological fracture         Plan     Problem List Items Addressed This Visit          Psychiatric    Anxiety - Primary     - monitor for worsening of symptoms  - stopped sertraline  - Started Escitalopram 5 mg QD  - follow up in 30 days  - follow up earlier if symptoms worsen or new symptoms occur             Relevant Medications    EScitalopram oxalate (LEXAPRO) 5 MG Tab       ID    Need for shingles vaccine     Administered 1st dose of Shingles vaccine         Relevant Orders    (In Office Administered) Zoster Recombinant Vaccine (Completed)       Endocrine    Thyroid nodule     - monitor size of the nodules  - monitor for symptoms of weight loss, palpitations, chest pain, SOB, weight gain, heat/cold intolerance, trouble swallowing, hoarseness of voice.  - TSH ordered  - Thyroid U/S ordered  - follow up in a month         Age-related osteoporosis without current pathological fracture     - DEXA scan ordered  - follow up in a month         Relevant Orders    DXA Bone Density Axial Skeleton 1 or more sites    Screening for osteoporosis    Relevant Orders    DXA Bone Density Axial Skeleton 1 or more sites         All orders:  Orders Placed This Encounter    DXA Bone Density Axial Skeleton 1 or more sites    US Thyroid    (In Office Administered) Zoster Recombinant Vaccine    TSH    EScitalopram oxalate (LEXAPRO) 5 MG Tab          Follow up in about 1 month (around 3/26/2024).    Fabi Hays MD MPH   [employer]

## 2024-02-28 DIAGNOSIS — R23.2 HOT FLASHES NOT DUE TO MENOPAUSE: Primary | ICD-10-CM

## 2024-03-25 ENCOUNTER — OFFICE VISIT (OUTPATIENT)
Dept: OBSTETRICS AND GYNECOLOGY | Facility: CLINIC | Age: 79
End: 2024-03-25
Payer: MEDICARE

## 2024-03-25 VITALS
SYSTOLIC BLOOD PRESSURE: 128 MMHG | DIASTOLIC BLOOD PRESSURE: 78 MMHG | OXYGEN SATURATION: 96 % | HEIGHT: 64 IN | HEART RATE: 78 BPM | WEIGHT: 117 LBS | BODY MASS INDEX: 19.97 KG/M2 | RESPIRATION RATE: 18 BRPM

## 2024-03-25 DIAGNOSIS — N60.02 BILATERAL BREAST CYSTS: ICD-10-CM

## 2024-03-25 DIAGNOSIS — F32.4 MAJOR DEPRESSIVE DISORDER IN PARTIAL REMISSION, UNSPECIFIED WHETHER RECURRENT: ICD-10-CM

## 2024-03-25 DIAGNOSIS — M81.0 AGE-RELATED OSTEOPOROSIS WITHOUT CURRENT PATHOLOGICAL FRACTURE: ICD-10-CM

## 2024-03-25 DIAGNOSIS — R26.81 UNSTEADINESS ON FEET: ICD-10-CM

## 2024-03-25 DIAGNOSIS — R23.2 HOT FLASHES NOT DUE TO MENOPAUSE: ICD-10-CM

## 2024-03-25 DIAGNOSIS — F41.9 ANXIETY: Primary | ICD-10-CM

## 2024-03-25 DIAGNOSIS — R06.02 SHORTNESS OF BREATH: ICD-10-CM

## 2024-03-25 DIAGNOSIS — R53.82 CHRONIC FATIGUE: ICD-10-CM

## 2024-03-25 DIAGNOSIS — R63.4 WEIGHT LOSS, UNINTENTIONAL: ICD-10-CM

## 2024-03-25 DIAGNOSIS — N60.01 BILATERAL BREAST CYSTS: ICD-10-CM

## 2024-03-25 PROCEDURE — 99204 OFFICE O/P NEW MOD 45 MIN: CPT | Mod: S$PBB,,, | Performed by: OBSTETRICS & GYNECOLOGY

## 2024-03-25 PROCEDURE — 99215 OFFICE O/P EST HI 40 MIN: CPT | Mod: PBBFAC | Performed by: OBSTETRICS & GYNECOLOGY

## 2024-03-25 NOTE — PATIENT INSTRUCTIONS
Schedule mammogram, DEXA, and thyroid US.   Stop by lab on the first floor for blood work.   I will send the referral to pulmonary. If you do not hear from them in 1 week, then call us and we will follow up on it for you.

## 2024-03-25 NOTE — PROGRESS NOTES
Annual Exam    Assessment/Plan:  79 y.o.  presenting for her annual exam:    Problem List Items Addressed This Visit          Psychiatric    Depression    Overview     - continue with the current treatment regimen (Wellbutrin 300 mg)  - monitor the symptoms  - follow up if symptoms worsen         Relevant Orders    Vitamin D    Anxiety - Primary       Endocrine    Age-related osteoporosis without current pathological fracture    Relevant Orders    Vitamin D    Vitamin B12     Other Visit Diagnoses       Hot flashes not due to menopause        Shortness of breath        Relevant Orders    NT-Pro Natriuretic Peptide    Bilateral breast cysts        Relevant Orders    Mammo Digital Diagnostic Bilat    Chronic fatigue        Relevant Orders    CBC Auto Differential    Basic Metabolic Panel    Vitamin D    Vitamin B12    Weight loss, unintentional        Relevant Orders    Vitamin D    Vitamin B12    Unsteadiness on feet        Relevant Orders    Vitamin B12          Annual exam performed.  No PAP indicated due to age.  Due to h/o breast cysts in the past, diagnostic mammogram ordered.   Due to fatigue and weight loss and shortness of breath, labs ordered.  Refer to pulmonary for shortness of breath.   F/u on labs.   RTC in 1 year for annual exam and/or prn.    Primary ordered DEXA and thyroid US. She was sent to the imaging center to schedule these appointments.     CC:   Chief Complaint   Patient presents with    Annual Exam     Pt is here for an annual exam with pap. She also wants to have some lab work done and to check her hormone level as well. She is c/o of feeling tired all of the time       HPI:  79 y.o.  presents for her gynecologic annual exam. She is having chronic fatigue and breathlessness on minimal exertion. She had a work up a few years ago that showed a positive stress test and she had a heart cath that was negative per patient.   She is being treated for anxiety with Welbutrin which is  "decreasing the episodes.  She had a pulmonary w/u a few years ago and was told that she had "mild COPD", but had no follow up after this.    Exam benign.  Mild vaginal atrophy.  No h/o abnormal PAPs  Feels breast cysts. Exam wnl. Requests MMG.      Patient seen and examined.      Health Maintenance:    Birth control: Menopause  Pregnancy plans: No   Safe relationship: ?  Healthy diet: Yes   Exercise: ?  PCP: See below     Screening:  Last pap smear: 5 years ago per patient  History of abnormal pap smears: Denies  STI screening: Declines  Mammogram: Desires    Review of Systems: The following ROS was otherwise negative, except as noted in the HPI:  constitutional, HEENT, respiratory, cardiovascular, gastrointestinal, genitourinary, skin, musculoskeletal, neurological, psych    Primary Care Physician: Guzman Caicedo DO    Obstetric History  OB History    Para Term  AB Living   3 2 2   1 2   SAB IAB Ectopic Multiple Live Births   1       2      # Outcome Date GA Lbr Can/2nd Weight Sex Delivery Anes PTL Lv   3 SAB      SAB      2 Term      Vag-Spont   SUSAN   1 Term         SUSAN       Gynecologic History  Menstrual History:   Age of Menarche: ?   Age of Menopause: 43      Sexual History:    Contraception: see above      Pap History:   History of abnormal pap smears: see above   Last pap: see above    Medical History:  Past Medical History:   Diagnosis Date    Allergy     Cancer     colon cancer no treatment     Depression     Diverticula, colon 2021    History of colon cancer, stage I 2021    Hyperlipidemia     Stroke        Medications:  Medication List with Changes/Refills   Current Medications    ASPIRIN (ECOTRIN) 81 MG EC TABLET    Take 81 mg by mouth once daily.    BUPROPION (WELLBUTRIN XL) 300 MG 24 HR TABLET    Take 1 tablet (300 mg total) by mouth once daily.    ESCITALOPRAM OXALATE (LEXAPRO) 5 MG TAB    Take 1 tablet (5 mg total) by mouth once daily.    FLUTICASONE PROPIONATE (FLONASE) 50 " "MCG/ACTUATION NASAL SPRAY    1 spray by Each Nostril route once daily.    LORATADINE (CLARITIN) 10 MG TABLET    Take 10 mg by mouth once daily.    MECLIZINE (ANTIVERT) 25 MG TABLET    Take 1 tablet (25 mg total) by mouth 3 (three) times daily as needed for Dizziness.    PRAVASTATIN (PRAVACHOL) 40 MG TABLET    Take 1 tablet (40 mg total) by mouth once daily.         Surgical History:  Past Surgical History:   Procedure Laterality Date    TONSILLECTOMY         Allergies:  Review of patient's allergies indicates:  No Known Allergies    Family History:  Family History   Problem Relation Age of Onset    Hypertension Mother     Heart disease Father     Cancer Brother        Social History:  Social History     Socioeconomic History    Marital status: Unknown   Tobacco Use    Smoking status: Never    Smokeless tobacco: Never   Substance and Sexual Activity    Alcohol use: Never    Drug use: Never    Sexual activity: Yes     Partners: Male       Objective:  Body mass index is 20.08 kg/m².    /78   Pulse 78   Resp 18   Ht 5' 4" (1.626 m)   Wt 53.1 kg (117 lb)   SpO2 96%   BMI 20.08 kg/m²     General: Alert, well appearing, no acute distress  Head: Normocephalic, atraumatic  Breasts: Symmetric, non-tender to palpation, no skin changes, palpable axillary lymph nodes or masses noted  Lungs: Unlabored respirations. Clear to auscultation bilaterally.  Cardiovascular: Regular rate and rhythm.  Abdomen: Soft, nontender, nondistended   Pelvic:   External: normal female genitalia, no masses or lesions   Vagina: pale pink mucosa with decreased rugae, physiologic discharge. Mild atrophic vaginits.  Cervix: no masses or lesions, nontender.  Uterus: approx 6 weeks, mobile, midline, nontender  Adnexa: no masses or fullness, nontender  Rectovaginal: deferred  Extremities: No redness or tenderness  Skin: Well perfused, normal coloration and turgor, no lesions or rashes visualized  Neuro: Alert, oriented, normal speech, no focal " deficits, moves extremities appropriately  Psych: Normal mood and behavior.

## 2024-03-26 ENCOUNTER — TELEPHONE (OUTPATIENT)
Dept: OBSTETRICS AND GYNECOLOGY | Facility: CLINIC | Age: 79
End: 2024-03-26
Payer: MEDICARE

## 2024-03-26 NOTE — TELEPHONE ENCOUNTER
----- Message from Arnol Woods MD sent at 3/26/2024  8:27 AM CDT -----  Please call her and tell her that her vitamin levels are normal. However, her kidney function tests are abnormal. Her Creatinine is elevated at 1.45. Her BNP is elevated. I'm going to send her referral to pulmonary today. I called them yesterday. They should call her soon. If she has any questions, let me know and I'll call her.

## 2024-03-27 ENCOUNTER — TELEPHONE (OUTPATIENT)
Dept: OBSTETRICS AND GYNECOLOGY | Facility: CLINIC | Age: 79
End: 2024-03-27
Payer: MEDICARE

## 2024-03-28 ENCOUNTER — HOSPITAL ENCOUNTER (OUTPATIENT)
Dept: RADIOLOGY | Facility: HOSPITAL | Age: 79
Discharge: HOME | End: 2024-03-28
Attending: STUDENT IN AN ORGANIZED HEALTH CARE EDUCATION/TRAINING PROGRAM
Payer: MEDICARE

## 2024-03-28 ENCOUNTER — OFFICE VISIT (OUTPATIENT)
Dept: PULMONOLOGY | Facility: CLINIC | Age: 79
End: 2024-03-28
Payer: MEDICARE

## 2024-03-28 VITALS
HEIGHT: 64 IN | WEIGHT: 117 LBS | OXYGEN SATURATION: 100 % | DIASTOLIC BLOOD PRESSURE: 54 MMHG | HEART RATE: 69 BPM | BODY MASS INDEX: 19.97 KG/M2 | SYSTOLIC BLOOD PRESSURE: 104 MMHG | RESPIRATION RATE: 18 BRPM

## 2024-03-28 DIAGNOSIS — R79.89 ELEVATED BRAIN NATRIURETIC PEPTIDE (BNP) LEVEL: ICD-10-CM

## 2024-03-28 DIAGNOSIS — R06.02 SHORTNESS OF BREATH: ICD-10-CM

## 2024-03-28 DIAGNOSIS — J31.0 CHRONIC RHINITIS: ICD-10-CM

## 2024-03-28 DIAGNOSIS — N17.9 AKI (ACUTE KIDNEY INJURY): Primary | ICD-10-CM

## 2024-03-28 PROCEDURE — 99215 OFFICE O/P EST HI 40 MIN: CPT | Mod: PBBFAC,25 | Performed by: STUDENT IN AN ORGANIZED HEALTH CARE EDUCATION/TRAINING PROGRAM

## 2024-03-28 PROCEDURE — 71046 X-RAY EXAM CHEST 2 VIEWS: CPT | Mod: 26,,, | Performed by: RADIOLOGY

## 2024-03-28 PROCEDURE — 71046 X-RAY EXAM CHEST 2 VIEWS: CPT | Mod: TC

## 2024-03-28 PROCEDURE — 99204 OFFICE O/P NEW MOD 45 MIN: CPT | Mod: S$PBB,,, | Performed by: STUDENT IN AN ORGANIZED HEALTH CARE EDUCATION/TRAINING PROGRAM

## 2024-03-28 RX ORDER — FLUTICASONE PROPIONATE 50 MCG
2 SPRAY, SUSPENSION (ML) NASAL DAILY
Qty: 15.8 ML | Refills: 1 | Status: SHIPPED | OUTPATIENT
Start: 2024-03-28

## 2024-03-28 NOTE — PROGRESS NOTES
[facility]  Pulmonology  NEW VISIT     Patient Name:  Shelbi May  Primary Care Provider: Guzman Caicedo DO  Date of Service: 03/28/2024  Reason for Referral:  Shortness of breath      Chief Complaint:  Shortness of breath    SUBJECTIVE   HPI:  Shelbi May is a 79 y.o. female with HLD who presents today upon referral with complaints of shortness of breath.     Lalo reports having shortness of breath for about 1 year with progression in the past 6 months.  She characterizes it as shortness of breath within walking through her own home.  She has to stop to catch her breath.  She was also noticed shortness of breath with arm raising.  She has HCTZ at home that she takes on occasion for hand swelling. She has a cough that is at times productive of yellow phlegm. She has ongoing congestion that is year round that she localizes in her maxillary sinus.  She intermittently uses Flonase for this. She has some back pain for which she takes once daily Aleve for her symptoms.    Past Medical History:   Diagnosis Date    Allergy     Cancer     colon cancer no treatment     Depression     Diverticula, colon 05/17/2021    History of colon cancer, stage I 05/17/2021    Hyperlipidemia     Stroke        Past Surgical History:   Procedure Laterality Date    TONSILLECTOMY         Family History   Problem Relation Age of Onset    Hypertension Mother     Heart disease Father     Cancer Brother         Social History     Socioeconomic History    Marital status: Unknown   Tobacco Use    Smoking status: Never    Smokeless tobacco: Never   Substance and Sexual Activity    Alcohol use: Never    Drug use: Never    Sexual activity: Yes     Partners: Male       Social History     Social History Narrative    Not on file       Review of patient's allergies indicates:  No Known Allergies     Medications: Medications reviewed to include over the counter medications.    Review of Systems: A focused ROS was completed and found  "to be negative except for that mentioned above.      OBJECTIVE   PHYSICAL EXAM:  Vitals:    03/28/24 0854   BP: (!) 104/54   BP Location: Left arm   Patient Position: Sitting   BP Method: Large (Manual)   Pulse: 69   Resp: 18   SpO2: 100%   Weight: 53.1 kg (117 lb)   Height: 5' 4" (1.626 m)        GENERAL: NAD  HEENT: normocephalic, non-icteric conjunctivae, moist oral mucosa  RESPIRATORY: clear to auscultation, no wheezing, rales or rhonchi  CARDIOVASCULAR: Regular rate and rhythm, no murmurs rubs or gallops.  SKIN: no rash, jaundice, ecchymosis or ulcers  MUSCULOSKELETAL: No clubbing or cyanosis; no pedal edema  NEUROLOGIC: AO ×3, no gross deficits    LABS:  Lab studies reviewed and notable for H/H 13.4/40.2, MCV 93.2, CO2 31, SCr 1.45, NTproBNP 861 (03/2024)    IMAGING:  Imaging reviewed and notable for CT Chest 12/2018 with chronic biapical scarring, no emphysema, no nodules or consolidation, prominent mediastinal adenopathy, no pleural effusion, normal appearing bilateral kidneys present    LUNG FUNCTION TESTING: None available to review or report    ASSESSMENT & PLAN     1. SANTOS (acute kidney injury)  Assessment & Plan:  Baseline SCr 0.9-1 now with Cr 1.4 a/w normal UOP per her report. She takes Aleve daily for back pain management. Plan for repeat labs and stopping NSAIDS. May use APAP which she was advised to limit to 1g daily. Will refer to Nephrology at this time as well. Cardiac work as per shortness of breath plan.    Orders:  -     Basic Metabolic Panel; Future; Expected date: 03/28/2024  -     NT-Pro Natriuretic Peptide; Future; Expected date: 03/28/2024  -     Ambulatory referral/consult to Nephrology; Future; Expected date: 04/04/2024    2. Shortness of breath  Assessment & Plan:  80 yo F lifelong non smoker with smoking exposure presents for evaluation with complaints of exertional dyspnea. Lab work notable for proBNP elevation and SANTOS. Review of prior records with imaging notable for mediastinal LAD " and otherwise clear lung fields. Will obtain CXR and TTE ASAP to assess for cardiac etiology. Future considerations include lung function testing and CT Chest if abnormal imaging.     Orders:  -     Ambulatory referral/consult to Pulmonology  -     Echo Saline Bubble? No; Future; Expected date: 03/28/2024  -     X-Ray Chest PA And Lateral; Future; Expected date: 03/28/2024    3. Elevated brain natriuretic peptide (BNP) level  -     Echo Saline Bubble? No; Future; Expected date: 03/28/2024    4. Chronic rhinitis  Assessment & Plan:  Will start daily flonase and cont claritin. Internasal medication administration reviewed.     Orders:  -     fluticasone propionate (FLONASE) 50 mcg/actuation nasal spray; 2 sprays (100 mcg total) by Each Nostril route once daily.  Dispense: 15.8 mL; Refill: 1           Follow up in about 3 weeks (around 4/18/2024).      Case was discussed with patient; all questions were answered to patient's satisfaction and patient verbalized understanding.       Shawna Barnes MD  Pulmonary Medicine  [facility]  Phone: [phone]

## 2024-03-29 NOTE — ASSESSMENT & PLAN NOTE
Baseline SCr 0.9-1 now with Cr 1.4 a/w normal UOP per her report. She takes Aleve daily for back pain management. Plan for repeat labs and stopping NSAIDS. May use APAP which she was advised to limit to 1g daily. Will refer to Nephrology at this time as well. Cardiac work as per shortness of breath plan.

## 2024-03-29 NOTE — ASSESSMENT & PLAN NOTE
80 yo F lifelong non smoker with smoking exposure presents for evaluation with complaints of exertional dyspnea. Lab work notable for proBNP elevation and SANTOS. Review of prior records with imaging notable for mediastinal LAD and otherwise clear lung fields. Will obtain CXR and TTE ASAP to assess for cardiac etiology. Future considerations include lung function testing and CT Chest if abnormal imaging.

## 2024-04-12 ENCOUNTER — HOSPITAL ENCOUNTER (OUTPATIENT)
Dept: CARDIOLOGY | Facility: HOSPITAL | Age: 79
Discharge: HOME | End: 2024-04-12
Attending: STUDENT IN AN ORGANIZED HEALTH CARE EDUCATION/TRAINING PROGRAM
Payer: MEDICARE

## 2024-04-12 VITALS — WEIGHT: 117 LBS | BODY MASS INDEX: 19.97 KG/M2 | HEIGHT: 64 IN

## 2024-04-12 DIAGNOSIS — R06.02 SHORTNESS OF BREATH: ICD-10-CM

## 2024-04-12 DIAGNOSIS — R79.89 ELEVATED BRAIN NATRIURETIC PEPTIDE (BNP) LEVEL: ICD-10-CM

## 2024-04-12 LAB
AORTIC ROOT ANNULUS: 2.01 CM
AORTIC VALVE CUSP SEPERATION: 1.78 CM
AV INDEX (PROSTH): 0.76
AV MEAN GRADIENT: 2 MMHG
AV PEAK GRADIENT: 4 MMHG
AV VALVE AREA BY VELOCITY RATIO: 1.85 CM²
AV VALVE AREA: 2.05 CM²
AV VELOCITY RATIO: 0.69
BSA FOR ECHO PROCEDURE: 1.55 M2
CV ECHO LV RWT: 0.38 CM
DOP CALC AO PEAK VEL: 1.06 M/S
DOP CALC AO VTI: 26.2 CM
DOP CALC LVOT AREA: 2.7 CM2
DOP CALC LVOT DIAMETER: 1.85 CM
DOP CALC LVOT PEAK VEL: 0.73 M/S
DOP CALC LVOT STROKE VOLUME: 53.73 CM3
DOP CALCLVOT PEAK VEL VTI: 20 CM
E WAVE DECELERATION TIME: 225.69 MSEC
E/A RATIO: 1.52
E/E' RATIO: 9.58 M/S
ECHO LV POSTERIOR WALL: 0.7 CM (ref 0.6–1.1)
FRACTIONAL SHORTENING: 32 % (ref 28–44)
GLOBAL LONGITUIDAL STRAIN: 15.8 %
INTERVENTRICULAR SEPTUM: 0.96 CM (ref 0.6–1.1)
IVC DIAMETER: 1.37 CM
LEFT ATRIUM SIZE: 2.2 CM
LEFT ATRIUM VOLUME INDEX MOD: 38.1 ML/M2
LEFT ATRIUM VOLUME MOD: 59.4 CM3
LEFT INTERNAL DIMENSION IN SYSTOLE: 2.52 CM (ref 2.1–4)
LEFT VENTRICLE DIASTOLIC VOLUME INDEX: 36.71 ML/M2
LEFT VENTRICLE DIASTOLIC VOLUME: 57.27 ML
LEFT VENTRICLE MASS INDEX: 55 G/M2
LEFT VENTRICLE SYSTOLIC VOLUME INDEX: 14.6 ML/M2
LEFT VENTRICLE SYSTOLIC VOLUME: 22.81 ML
LEFT VENTRICULAR INTERNAL DIMENSION IN DIASTOLE: 3.68 CM (ref 3.5–6)
LEFT VENTRICULAR MASS: 85.83 G
LV LATERAL E/E' RATIO: 9.1 M/S
LV SEPTAL E/E' RATIO: 10.11 M/S
LVOT MG: 1.24 MMHG
LVOT MV: 0.53 CM/S
MV PEAK A VEL: 0.6 M/S
MV PEAK E VEL: 0.91 M/S
MV STENOSIS PRESSURE HALF TIME: 65.45 MS
MV VALVE AREA P 1/2 METHOD: 3.36 CM2
OHS LV EJECTION FRACTION SIMPSONS BIPLANE MOD: 57 %
PISA TR MAX VEL: 2.48 M/S
PV PEAK GRADIENT: 3 MMHG
PV PEAK VELOCITY: 0.87 M/S
RA PRESSURE ESTIMATED: 3 MMHG
RA VOL SYS: 16.37 ML
RIGHT ATRIAL AREA: 9.3 CM2
RIGHT VENTRICLE DIASTOLIC LENGTH: 7.1 CM
RIGHT VENTRICLE DIASTOLIC MID DIMENSION: 1.1 CM
RIGHT VENTRICULAR END-DIASTOLIC DIMENSION: 2.7 CM
RIGHT VENTRICULAR LENGTH IN DIASTOLE (APICAL 4-CHAMBER VIEW): 7.14 CM
RV MID DIAMA: 1.13 CM
RV TB RVSP: 5 MMHG
TDI LATERAL: 0.1 M/S
TDI SEPTAL: 0.09 M/S
TDI: 0.1 M/S
TR MAX PG: 25 MMHG
TRICUSPID ANNULAR PLANE SYSTOLIC EXCURSION: 1.96 CM
TV REST PULMONARY ARTERY PRESSURE: 28 MMHG
Z-SCORE OF LEFT VENTRICULAR DIMENSION IN END DIASTOLE: -2.01
Z-SCORE OF LEFT VENTRICULAR DIMENSION IN END SYSTOLE: -0.81

## 2024-04-12 PROCEDURE — 93356 MYOCRD STRAIN IMG SPCKL TRCK: CPT

## 2024-04-12 PROCEDURE — 93306 TTE W/DOPPLER COMPLETE: CPT | Mod: 26,,, | Performed by: STUDENT IN AN ORGANIZED HEALTH CARE EDUCATION/TRAINING PROGRAM

## 2024-04-12 PROCEDURE — 93356 MYOCRD STRAIN IMG SPCKL TRCK: CPT | Mod: ,,, | Performed by: STUDENT IN AN ORGANIZED HEALTH CARE EDUCATION/TRAINING PROGRAM

## 2024-04-18 ENCOUNTER — OFFICE VISIT (OUTPATIENT)
Dept: PULMONOLOGY | Facility: CLINIC | Age: 79
End: 2024-04-18
Payer: MEDICARE

## 2024-04-18 VITALS
HEART RATE: 99 BPM | WEIGHT: 116 LBS | BODY MASS INDEX: 19.81 KG/M2 | RESPIRATION RATE: 18 BRPM | HEIGHT: 64 IN | SYSTOLIC BLOOD PRESSURE: 104 MMHG | OXYGEN SATURATION: 97 % | DIASTOLIC BLOOD PRESSURE: 54 MMHG

## 2024-04-18 DIAGNOSIS — R06.02 SHORTNESS OF BREATH: Primary | ICD-10-CM

## 2024-04-18 DIAGNOSIS — R79.89 ELEVATED BRAIN NATRIURETIC PEPTIDE (BNP) LEVEL: ICD-10-CM

## 2024-04-18 DIAGNOSIS — N17.9 AKI (ACUTE KIDNEY INJURY): ICD-10-CM

## 2024-04-18 DIAGNOSIS — J31.0 CHRONIC RHINITIS: ICD-10-CM

## 2024-04-18 PROCEDURE — 99213 OFFICE O/P EST LOW 20 MIN: CPT | Mod: PBBFAC | Performed by: STUDENT IN AN ORGANIZED HEALTH CARE EDUCATION/TRAINING PROGRAM

## 2024-04-18 PROCEDURE — 99214 OFFICE O/P EST MOD 30 MIN: CPT | Mod: S$PBB,,, | Performed by: STUDENT IN AN ORGANIZED HEALTH CARE EDUCATION/TRAINING PROGRAM

## 2024-04-18 NOTE — ASSESSMENT & PLAN NOTE
Baseline SCr 0.9-1 now with Cr 1.4 a/w normal UOP per her report. She was taking Aleve daily for back pain management. Plan for repeat labs and stopping NSAIDS. Currently using APAP.   - labs to be obtained today  - nephrology appointment scheduled 04/24  - recommend follow up with Spine team for ongoing neck pain now with discomfort with arm raise

## 2024-04-18 NOTE — ASSESSMENT & PLAN NOTE
Likely related to renal dysfunction. TTE with pEF and normal diastolic function. Repeat lab today.

## 2024-04-18 NOTE — PROGRESS NOTES
[facility]  Pulmonology  ESTABLISHED VISIT     Patient Name:  Shelbi May  Primary Care Provider: Guzman Caicedo DO  Date of Service: 04/18/2024      Chief Complaint:  Shortness of breath    SUBJECTIVE   HPI:  Shelbi May is a 79 y.o. female with HLD who presents today for follow up of shortness of breath.     Lalo reports persistent shortness of breath that is episodic in nature. Remains characterized by episodic dyspnea sometimes with walking around her home to the extent that she has to stop to take a breath. She has had improvement in her sinus congestion symptoms with consistent flonase use. She has no cough. No ED or urgent care visits. She has stopped taking Aleve daily and now takes Tylenol 1000 to 1500 mg daily for her neck pain.     Initial HPI  Lalo reports having shortness of breath for about 1 year with progression in the past 6 months.  She characterizes it as shortness of breath within walking through her own home.  She has to stop to catch her breath.  She was also noticed shortness of breath with arm raising.  She has HCTZ at home that she takes on occasion for hand swelling. She has a cough that is at times productive of yellow phlegm. She has ongoing congestion that is year round that she localizes in her maxillary sinus.  She intermittently uses Flonase for this. She has some back pain for which she takes once daily Aleve for her symptoms.    Past Medical History:   Diagnosis Date    Allergy     Cancer     colon cancer no treatment     Depression     Diverticula, colon 05/17/2021    History of colon cancer, stage I 05/17/2021    Hyperlipidemia     Stroke        Past Surgical History:   Procedure Laterality Date    TONSILLECTOMY         Family History   Problem Relation Name Age of Onset    Hypertension Mother      Heart disease Father      Cancer Brother          Social History     Socioeconomic History    Marital status: Unknown   Tobacco Use    Smoking status: Never     "Smokeless tobacco: Never   Substance and Sexual Activity    Alcohol use: Never    Drug use: Never    Sexual activity: Yes     Partners: Male       Social History     Social History Narrative    Not on file       Review of patient's allergies indicates:  No Known Allergies     Medications: Medications reviewed to include over the counter medications.    Review of Systems: A focused ROS was completed and found to be negative except for that mentioned above.      OBJECTIVE   PHYSICAL EXAM:  Vitals:    04/18/24 0927   BP: (!) 104/54   BP Location: Left arm   Patient Position: Sitting   BP Method: Small (Manual)   Pulse: 99   Resp: 18   SpO2: 97%   Weight: 52.6 kg (116 lb)   Height: 5' 4" (1.626 m)        GENERAL: NAD  HEENT: normocephalic, non-icteric conjunctivae, moist oral mucosa  RESPIRATORY: clear to auscultation, no wheezing, rales or rhonchi  CARDIOVASCULAR: Regular rate and rhythm, no murmurs rubs or gallops.  SKIN: no rash, jaundice, ecchymosis or ulcers  MUSCULOSKELETAL: No clubbing or cyanosis; no pedal edema  NEUROLOGIC: AO ×3, no gross deficits    LABS:  Lab studies reviewed and notable for H/H 13.4/40.2, MCV 93.2, CO2 31, SCr 1.45, NTproBNP 861 (03/2024)    IMAGING:  Imaging reviewed and notable for CT Chest 12/2018 with chronic biapical scarring, no emphysema, no nodules or consolidation, prominent mediastinal adenopathy, no pleural effusion, normal appearing bilateral kidneys present. CXR 03/2024 with clear lung fields bilaterally, hyperinflation and narrowed mediastinum    TTE:   04/2024:  TTE with LVEF 55-60%, normal diastolic function, normal RV size, TAPSE 1.96, mild LA dilation, normal RA size, trace MR, mild TR, PASP 28    LUNG FUNCTION TESTING: None available to review or report    ASSESSMENT & PLAN     1. Shortness of breath  Assessment & Plan:  78 yo F lifelong non smoker with smoking exposure presents for follow up with persistent episodic exertional dyspnea. Review of prior records with " imaging notable for mediastinal LAD and otherwise clear lung fields. CXR with clear lung fields 03/2024 and TTE with no cardiac dysfunction. Will work up airway disease with PFTs and 6MWT.  Future considerations include lung function testing and CT Chest if inconclusive work up.    Orders:  -     Stress test, pulmonary; Future  -     Complete PFT w/ bronchodilator; Future    2. SANTOS (acute kidney injury)  Assessment & Plan:  Baseline SCr 0.9-1 now with Cr 1.4 a/w normal UOP per her report. She was taking Aleve daily for back pain management. Plan for repeat labs and stopping NSAIDS. Currently using APAP.   - labs to be obtained today  - nephrology appointment scheduled 04/24  - recommend follow up with Spine team for ongoing neck pain now with discomfort with arm raise      3. Chronic rhinitis  Assessment & Plan:  Improved with intranasal fluticasone.   - cont daily flonase and cont claritin      4. Elevated brain natriuretic peptide (BNP) level  Assessment & Plan:  Likely related to renal dysfunction. TTE with pEF and normal diastolic function. Repeat lab today.              Follow up in about 5 weeks (around 5/23/2024).      Case was discussed with patient; all questions were answered to patient's satisfaction and patient verbalized understanding.       Shawna Barnes MD  Pulmonary Medicine  [facility]  Phone: [phone]

## 2024-04-18 NOTE — ASSESSMENT & PLAN NOTE
80 yo F lifelong non smoker with smoking exposure presents for follow up with persistent episodic exertional dyspnea. Review of prior records with imaging notable for mediastinal LAD and otherwise clear lung fields. CXR with clear lung fields 03/2024 and TTE with no cardiac dysfunction. Will work up airway disease with PFTs and 6MWT.  Future considerations include lung function testing and CT Chest if inconclusive work up.

## 2024-04-22 ENCOUNTER — TELEPHONE (OUTPATIENT)
Dept: PULMONOLOGY | Facility: CLINIC | Age: 79
End: 2024-04-22
Payer: MEDICARE

## 2024-04-22 ENCOUNTER — OFFICE VISIT (OUTPATIENT)
Dept: FAMILY MEDICINE | Facility: CLINIC | Age: 79
End: 2024-04-22
Payer: MEDICARE

## 2024-04-22 VITALS
DIASTOLIC BLOOD PRESSURE: 78 MMHG | SYSTOLIC BLOOD PRESSURE: 126 MMHG | WEIGHT: 118 LBS | TEMPERATURE: 98 F | HEART RATE: 76 BPM | OXYGEN SATURATION: 98 % | RESPIRATION RATE: 18 BRPM | HEIGHT: 64 IN | BODY MASS INDEX: 20.14 KG/M2

## 2024-04-22 DIAGNOSIS — N18.32 STAGE 3B CHRONIC KIDNEY DISEASE: ICD-10-CM

## 2024-04-22 DIAGNOSIS — G47.30 SLEEP APNEA, UNSPECIFIED TYPE: ICD-10-CM

## 2024-04-22 DIAGNOSIS — F32.4 MAJOR DEPRESSIVE DISORDER IN PARTIAL REMISSION, UNSPECIFIED WHETHER RECURRENT: Primary | ICD-10-CM

## 2024-04-22 DIAGNOSIS — D69.6 THROMBOCYTOPENIA, UNSPECIFIED: ICD-10-CM

## 2024-04-22 DIAGNOSIS — F41.9 ANXIETY: ICD-10-CM

## 2024-04-22 PROCEDURE — 99213 OFFICE O/P EST LOW 20 MIN: CPT | Mod: GC,,, | Performed by: FAMILY MEDICINE

## 2024-04-22 RX ORDER — ACETAMINOPHEN 500 MG
500 TABLET ORAL EVERY 6 HOURS PRN
COMMUNITY

## 2024-04-22 NOTE — PROGRESS NOTES
Fabi Hays MD MPH  [address], [place] [address]  Phone: [phone]     Subjective     Name: Shelbi May   YOB: 1945 (79 y.o.)  MRN: [id number]  Visit Date: 4/22/2024   Chief Complaint: Follow-up and Anxiety (Patient came in today to follow up on her anxiety. Patient stated her anxiety is going good. Patient stated she needs and appointment for a bone density test as well she stated she tried to set one up, but they wouldn't let her. Patient is still having some shortness of breath going on she stated it is not that bad, but she is still experiencing some.)        HISTORY OF PRESENT ILLNESS:  Patient is 77 yo female with PMH of HLD, BPPV, Depression, and anxiety. She came to the clinic for a follow up appointment. She was not in any acute distress during the encounter. She has been compliant with her medications and dietary restrictions. She reported that her depression symptoms are well controlled with Wellbutrin. She reported that her anxiety symptoms have been well controlled as well. She denied any recent panic attacks.  Her SOB is improved. She saw Dr Barnes and her work up was WNL. Her ECHO was unremarkable as well.  She reported about apneic episodes during sleep and being tired when she wakes up in the morning.  Patient was referred to Ob/Gyn for hot flashes and is following up with Dr Woods for the symptoms and work up.          PAST MEDICAL HISTORY:  Significant Diagnoses - Patient  has a past medical history of Allergy, Cancer, Depression, Diverticula, colon (05/17/2021), History of colon cancer, stage I (05/17/2021), Hyperlipidemia, and Stroke.  Medications - Patient has a current medication list which includes the following long-term medication(s): aspirin, bupropion, loratadine, meclizine, and pravastatin.   Allergies - Patient has No Known Allergies.  Surgeries - Patient  has a past surgical history that includes Tonsillectomy.  Family History - Patient family  history includes Cancer in her brother; Heart disease in her father; Hypertension in her mother.      SOCIAL HISTORY:  Tobacco - Patient  reports that she has never smoked. She has never used smokeless tobacco.   Alcohol - Patient  reports no history of alcohol use.   Recreational Drugs - Patient  reports no history of drug use.       Review of Systems   Constitutional:  Negative for activity change, appetite change, chills, fatigue, fever and unexpected weight change.   HENT:  Negative for nasal congestion, ear discharge, ear pain, hearing loss, postnasal drip, rhinorrhea, sinus pressure/congestion and sore throat.    Eyes:  Negative for discharge, itching and visual disturbance.   Respiratory:  Positive for shortness of breath. Negative for cough, choking, chest tightness and wheezing.    Cardiovascular:  Negative for chest pain, palpitations, leg swelling and claudication.   Gastrointestinal:  Negative for abdominal distention, abdominal pain, constipation, diarrhea, nausea, vomiting and reflux.   Genitourinary:  Negative for difficulty urinating, dysuria, frequency, hematuria and urgency.   Musculoskeletal:  Negative for arthralgias, joint swelling and myalgias.   Integumentary:  Negative for rash.   Neurological:  Negative for tremors, light-headedness, numbness and headaches.   Psychiatric/Behavioral:  Negative for agitation, behavioral problems, confusion and decreased concentration.         Sleep apnea and daytime tiredness          Past Medical History:   Diagnosis Date    Allergy     Cancer     colon cancer no treatment     Depression     Diverticula, colon 05/17/2021    History of colon cancer, stage I 05/17/2021    Hyperlipidemia     Stroke         Review of patient's allergies indicates:  No Known Allergies     Past Surgical History:   Procedure Laterality Date    TONSILLECTOMY          Family History   Problem Relation Name Age of Onset    Hypertension Mother      Heart disease Father      Cancer  "Brother         Current Outpatient Medications   Medication Instructions    acetaminophen (TYLENOL) 500 mg, Oral, Every 6 hours PRN    aspirin (ECOTRIN) 81 mg, Oral, Daily    buPROPion (WELLBUTRIN XL) 300 mg, Oral, Daily    fluticasone propionate (FLONASE) 100 mcg, Each Nostril, Daily    loratadine (CLARITIN) 10 mg, Oral, Daily    meclizine (ANTIVERT) 25 mg, Oral, 3 times daily PRN    pravastatin (PRAVACHOL) 40 mg, Oral, Daily        Objective     /78 (BP Location: Left arm, Patient Position: Sitting, BP Method: Medium (Automatic))   Pulse 76   Temp 97.9 °F (36.6 °C) (Oral)   Resp 18   Ht 5' 4" (1.626 m)   Wt 53.5 kg (118 lb)   SpO2 98%   BMI 20.25 kg/m²     Physical Exam  Vitals and nursing note reviewed.   Constitutional:       General: She is not in acute distress.     Appearance: Normal appearance. She is normal weight. She is not ill-appearing.   HENT:      Head: Normocephalic and atraumatic.      Right Ear: Tympanic membrane and ear canal normal.      Left Ear: Tympanic membrane and ear canal normal.      Nose: Nose normal.      Mouth/Throat:      Mouth: Mucous membranes are moist.      Pharynx: Oropharynx is clear.   Eyes:      Extraocular Movements: Extraocular movements intact.      Conjunctiva/sclera: Conjunctivae normal.      Pupils: Pupils are equal, round, and reactive to light.   Cardiovascular:      Rate and Rhythm: Normal rate and regular rhythm.      Pulses: Normal pulses.      Heart sounds: Normal heart sounds. No murmur heard.  Pulmonary:      Effort: Pulmonary effort is normal. No respiratory distress.      Breath sounds: Normal breath sounds. No wheezing.   Abdominal:      General: Bowel sounds are normal. There is no distension.      Palpations: Abdomen is soft.      Tenderness: There is no abdominal tenderness. There is no guarding.   Musculoskeletal:      Cervical back: Normal range of motion and neck supple.   Skin:     Capillary Refill: Capillary refill takes less than 2 " seconds.   Neurological:      General: No focal deficit present.      Mental Status: She is alert and oriented to person, place, and time. Mental status is at baseline.   Psychiatric:         Mood and Affect: Mood normal.         Behavior: Behavior normal.         Thought Content: Thought content normal.         Judgment: Judgment normal.          All recently obtained labs have been reviewed and discussed in detail with the patient.   Assessment     1. Major depressive disorder in partial remission, unspecified whether recurrent    2. Sleep apnea, unspecified type    3. Stage 3b chronic kidney disease    4. Thrombocytopenia, unspecified    5. Anxiety         Plan     Problem List Items Addressed This Visit          Psychiatric    Depression - Primary     - monitor for worsening of current symptoms (excessive sadness, sleep disturbance, decreased appetite, decreased energy., etc.)  - watch out for suicidal or homicidal ideations  - watch out for side effects of the medications  - Wellbutrin 300 mg   - maintain compliance with medication  - follow up in a month  - follow up earlier if symptoms worsen, fail to improve or medications adverse effects develop.           Anxiety     - monitor for worsening of symptoms  - notify if suicidal or homicidal ideation occur  - notify if sleep disturbance develop or worsen  - follow up in a month  - follow up sooner if symptoms worsen or fail to improve              Renal/    Stage 3b chronic kidney disease     - patient has been recently diagnosed with stage 3 CKD  - - monitor for worsening of symptoms. Watch out for decreased urine output, swelling, dehydration, burning micturition, increased urinary frequency, dysuria etc.  - avoid nephrotoxic medications such OTC pain medications  - patient is following up with Nephrology (appointment with Dr cordoba on 4/24/24)                Hematology    Thrombocytopenia, unspecified     - patient has thrombocytopenia  - she denied any  bleeding at the moment  - platelet infusion not recommended at this moment  - will monitor labs and s/s  - will consider hematology consult as needed            Other    Sleep apnea     - monitor for worsening of symptoms  - patient was referred for a sleep study performed           Relevant Orders    Ambulatory referral/consult to Sleep Disorders         All orders:  Orders Placed This Encounter    Ambulatory referral/consult to Sleep Disorders          Follow up in about 4 weeks (around 5/20/2024).    Fabi Hays MD MPH   [facility]

## 2024-04-22 NOTE — TELEPHONE ENCOUNTER
Called the patient to discuss her results of BNP and kidney function testing.  There is a improvement in renal function with persistent BNP elevation.  Continue to encourage follow-up with Nephrology which is scheduled for this week.  Patient in agreement.

## 2024-04-22 NOTE — ASSESSMENT & PLAN NOTE
- patient has been recently diagnosed with stage 3 CKD  - - monitor for worsening of symptoms. Watch out for decreased urine output, swelling, dehydration, burning micturition, increased urinary frequency, dysuria etc.  - avoid nephrotoxic medications such OTC pain medications  - patient is following up with Nephrology (appointment with Dr cordoba on 4/24/24)

## 2024-04-22 NOTE — ASSESSMENT & PLAN NOTE
- monitor for worsening of current symptoms (excessive sadness, sleep disturbance, decreased appetite, decreased energy., etc.)  - watch out for suicidal or homicidal ideations  - watch out for side effects of the medications  - Wellbutrin 300 mg   - maintain compliance with medication  - follow up in a month  - follow up earlier if symptoms worsen, fail to improve or medications adverse effects develop.

## 2024-04-22 NOTE — ASSESSMENT & PLAN NOTE
- patient has thrombocytopenia  - she denied any bleeding at the moment  - platelet infusion not recommended at this moment  - will monitor labs and s/s  - will consider hematology consult as needed

## 2024-04-22 NOTE — ASSESSMENT & PLAN NOTE
- monitor for worsening of symptoms  - notify if suicidal or homicidal ideation occur  - notify if sleep disturbance develop or worsen  - follow up in a month  - follow up sooner if symptoms worsen or fail to improve

## 2024-04-24 ENCOUNTER — OFFICE VISIT (OUTPATIENT)
Dept: NEPHROLOGY | Facility: CLINIC | Age: 79
End: 2024-04-24
Payer: MEDICARE

## 2024-04-24 ENCOUNTER — TELEPHONE (OUTPATIENT)
Dept: NEPHROLOGY | Facility: CLINIC | Age: 79
End: 2024-04-24
Payer: MEDICARE

## 2024-04-24 VITALS
WEIGHT: 117 LBS | SYSTOLIC BLOOD PRESSURE: 130 MMHG | OXYGEN SATURATION: 99 % | RESPIRATION RATE: 18 BRPM | BODY MASS INDEX: 19.97 KG/M2 | HEIGHT: 64 IN | HEART RATE: 67 BPM | DIASTOLIC BLOOD PRESSURE: 70 MMHG

## 2024-04-24 DIAGNOSIS — E55.9 VITAMIN D DEFICIENCY, UNSPECIFIED: ICD-10-CM

## 2024-04-24 DIAGNOSIS — N19 UNSPECIFIED KIDNEY FAILURE: ICD-10-CM

## 2024-04-24 DIAGNOSIS — F41.9 ANXIETY: ICD-10-CM

## 2024-04-24 DIAGNOSIS — N17.9 AKI (ACUTE KIDNEY INJURY): Primary | ICD-10-CM

## 2024-04-24 DIAGNOSIS — R80.9 PROTEINURIA: ICD-10-CM

## 2024-04-24 DIAGNOSIS — E11.29 PROTEINURIA DUE TO TYPE 2 DIABETES MELLITUS: ICD-10-CM

## 2024-04-24 DIAGNOSIS — R80.9 PROTEINURIA DUE TO TYPE 2 DIABETES MELLITUS: ICD-10-CM

## 2024-04-24 PROBLEM — N18.32 STAGE 3B CHRONIC KIDNEY DISEASE: Status: RESOLVED | Noted: 2024-04-22 | Resolved: 2024-04-24

## 2024-04-24 PROCEDURE — 99204 OFFICE O/P NEW MOD 45 MIN: CPT | Mod: S$PBB,,, | Performed by: INTERNAL MEDICINE

## 2024-04-24 PROCEDURE — 99215 OFFICE O/P EST HI 40 MIN: CPT | Mod: PBBFAC | Performed by: INTERNAL MEDICINE

## 2024-04-24 NOTE — TELEPHONE ENCOUNTER
----- Message from Radha West DO sent at 4/24/2024  1:52 PM CDT -----  Her labs have fluctuated back down. No totally clear why. Please ask her to drink plenty of water and review any other OTC medications with her. I think we should have her return for hematuria/proteinuria labs. Also can we move up her follow up with me to three months. TY

## 2024-04-24 NOTE — PROGRESS NOTES
[facility] History and Physical  Patient Name: Shelbi May  MRN: [id number]  Age: 79 y.o.  : 1945    Date: 2024 10:31 AM    Chief Complaint: Establish Care    HPI :   Ms May presents to Nephrology clinic to establish care. She is followed by Dr. Guzman DO as her primary care provider. She is referred by her Pulmonologist Dr. Barnes.     Hx of CVA (about 10 years): on ASA, statin.     Anxiety: followed by PCP. On wellbutrin.     Nephrology history: No FH of kidney disease, no nephrolithiasis, or recurrent UTIs. Recently using OTC medications including NSAIDs for back pain. Aleve 200 mg daily for about one year. She used to take a lot of NSAIDs related to her cycle. Now using tylenol.     Hx of malignant cancerous polyp>: s/p GI removal.     Patient denies any CP, SOB, peripheral edema, dysuria, hematuria, changes in urinary habits, or increased frequency of urination.     Past Medical History:  has a past medical history of Allergy, Cancer, Depression, Diverticula, colon (2021), History of colon cancer, stage I (2021), Hyperlipidemia, and Stroke.     Past Surgical History:   has a past surgical history that includes Tonsillectomy.     Family History:  family history includes Cancer in her brother; Heart disease in her father; Hypertension in her mother. No family history of kidney disease.     Social History:   reports that she has never smoked. She has never used smokeless tobacco. She reports that she does not drink alcohol and does not use drugs.     Allergies: has No Known Allergies.     Medications: Reviewed including OTC medications, herbal supplements, and NSAIDS.     Old records have been reviewed.      Review of Systems:  ROS: A 10 point ROS was completed and found to be negative except for that mentioned above.          Physical Exam:  Vitals:    24 1032   BP: 130/70   Pulse: 67   Resp: 18       Constitutional:  sitting in chair, in NAD  Eyes: EOMI, white sclera  ENMT: moist mucus membranes, nares patent  Cardiovascular: normal rate, S1/S2 noted, no edema  Respiratory: symmetrical chest expansion, CTA-B  Gastrointestinal: +BS, soft, NT/ND  Musculoskeletal: normal, no joint erythema/effusions  Skin: no rash, no purpura, warm extremities  Neurological: Alert and Oriented x 4, afocal    Labs:   Lab Results   Component Value Date     04/18/2024    K 3.9 04/18/2024    CREATININE 1.27 (H) 04/18/2024    ALT 15 03/08/2023    AST 18 03/08/2023    TSH 1.950 02/26/2024    WBC 4.60 03/25/2024    HGB 13.4 03/25/2024    HCT 40.2 03/25/2024     (L) 03/25/2024        Otherwise Reviewed    Assessment/Plan:       SANTOS likely in setting of NSAID nephropathy. Baseline TBD> recent repeat renal function improved since stopping Aleve. Will repeat today and continue to monitor. Will obtain UA with UCPR, UACR to assess for signs of CKD. Counseled to avoid nephrotoxic agents such as NSAIDs. Will obtain baseline renal ultrasound.    Anemia: CBC pending    BMD: Calcium and Phosphorus PTH, Vit D pending    RTC 6 months with CBC, RFP, UA, urine for Prot:creat ratio, PTH, Vit D      Alisha S. Parker, DO [facility]   04/24/2024

## 2024-04-24 NOTE — TELEPHONE ENCOUNTER
Spoke to , she is aware of her lab work. Reviewed OTC meds, not taking any NSAIDS, voiced that she only takes Tylenol. Protein labs ordered. F/U moved to 3 months.

## 2024-04-26 ENCOUNTER — CLINICAL SUPPORT (OUTPATIENT)
Dept: PULMONOLOGY | Facility: HOSPITAL | Age: 79
End: 2024-04-26
Payer: MEDICARE

## 2024-04-26 VITALS — OXYGEN SATURATION: 97 % | WEIGHT: 117 LBS | BODY MASS INDEX: 19.97 KG/M2 | HEIGHT: 64 IN

## 2024-04-26 DIAGNOSIS — R06.02 SHORTNESS OF BREATH: ICD-10-CM

## 2024-04-26 PROCEDURE — 94618 PULMONARY STRESS TESTING: CPT

## 2024-04-26 PROCEDURE — 94060 EVALUATION OF WHEEZING: CPT

## 2024-04-26 PROCEDURE — 94729 DIFFUSING CAPACITY: CPT

## 2024-04-26 PROCEDURE — 27100098 HC SPACER

## 2024-04-29 PROCEDURE — 94618 PULMONARY STRESS TESTING: CPT | Mod: 26,,, | Performed by: STUDENT IN AN ORGANIZED HEALTH CARE EDUCATION/TRAINING PROGRAM

## 2024-04-29 NOTE — PROCEDURES
PFT REPORT:  SPIROMETRY:  The pre-BD FVC is normal, 2.36L/-0.52 Z score.  The pre-BD FEV1 is normal, 1.86L/-0.37 Z score.  Thre pre-BD FEV1/FVC ratio is 79/0.25 Z score.    The post-BD FVC is normal, 2.37L/-0.50 Z score.  The post-BD FEV1 is normal, 1.91L/-0.23 Z score.  The post-BD FEV1/FVC ratio is 81/0.51 Z score.    The is no significant bronchodilator effect.  The flow volume loop is normal.    LUNG VOLUMES:  The TLC is normal, 4.89L/-0.29 Z score.  The FRC is normal, 3.59L/1.02 Z score.  The RV is normal, 2.51L/0.32 Z score.    DIFFUSION CAPACITY:  The diffusion capacity is corrected for hemoglobin and is decreased, 12.76/-2.27 Z score.    Interpretation:  Spirometry is normal.  There is a significant increase in GWQ02-60 post-BD that may represent small airway disease.   The flow volume loop is normal.  There is no restrictive defect.  There may be hyperinflation present with %pred, however, remains within normal limits per ULN cutoff.  There is a mild diffusion defect. Decreased diffusion capacity is suggestive of pulmonary vascular disease, emphysema, interstitial lung disease and/or anemia. Clinical correlation is advised.   Good quality study.    Shawna Barnes MD  Pulmonary Medicine  [facility]

## 2024-04-29 NOTE — PROCEDURES
SIX MINUTE WALK DISTANCE  BASELINE VITALS  HR: 87   BP: 108/55  SPO2: 99% on RA    END OF STUDY VITALS:  HR: 88  BP: 145/78  SPO2: 96% on RA    RECOVERY VITALS:  After 1 min rest  HR: 84  BP: 111/60  SPO2: 100% on RA    Patient walked 1064 ft with 1 rests.   SpO2 cheli was 96% at 4 minutes.       IMPRESSION:  Patient does not need oxygen supplementation at rest at rest or with ambulation.      Shawna Barnes MD  Pulmonary and Critical Care  [facility]

## 2024-04-30 PROCEDURE — 94729 DIFFUSING CAPACITY: CPT | Mod: 26,,, | Performed by: STUDENT IN AN ORGANIZED HEALTH CARE EDUCATION/TRAINING PROGRAM

## 2024-04-30 PROCEDURE — 94726 PLETHYSMOGRAPHY LUNG VOLUMES: CPT | Mod: 26,,, | Performed by: STUDENT IN AN ORGANIZED HEALTH CARE EDUCATION/TRAINING PROGRAM

## 2024-04-30 PROCEDURE — 94060 EVALUATION OF WHEEZING: CPT | Mod: 26,,, | Performed by: STUDENT IN AN ORGANIZED HEALTH CARE EDUCATION/TRAINING PROGRAM

## 2024-05-01 ENCOUNTER — TELEPHONE (OUTPATIENT)
Dept: NEPHROLOGY | Facility: CLINIC | Age: 79
End: 2024-05-01
Payer: MEDICARE

## 2024-05-01 DIAGNOSIS — N17.9 AKI (ACUTE KIDNEY INJURY): Primary | ICD-10-CM

## 2024-05-01 NOTE — TELEPHONE ENCOUNTER
----- Message from Radha West DO sent at 5/1/2024  1:37 PM CDT -----  Lab/workup unremarkable.  I would like to repeat her renal function panel and GAYLE in one month to see how her function is doing. TY

## 2024-05-03 ENCOUNTER — HOSPITAL ENCOUNTER (OUTPATIENT)
Dept: RADIOLOGY | Facility: HOSPITAL | Age: 79
Discharge: HOME | End: 2024-05-03
Attending: SPECIALIST
Payer: MEDICARE

## 2024-05-03 DIAGNOSIS — M81.0 AGE-RELATED OSTEOPOROSIS WITHOUT CURRENT PATHOLOGICAL FRACTURE: ICD-10-CM

## 2024-05-03 DIAGNOSIS — Z13.820 SCREENING FOR OSTEOPOROSIS: ICD-10-CM

## 2024-05-03 DIAGNOSIS — N17.9 AKI (ACUTE KIDNEY INJURY): ICD-10-CM

## 2024-05-03 DIAGNOSIS — E04.2 MULTIPLE THYROID NODULES: ICD-10-CM

## 2024-05-03 PROCEDURE — 76536 US EXAM OF HEAD AND NECK: CPT | Mod: 26,,, | Performed by: STUDENT IN AN ORGANIZED HEALTH CARE EDUCATION/TRAINING PROGRAM

## 2024-05-03 PROCEDURE — 77080 DXA BONE DENSITY AXIAL: CPT | Mod: 26,,, | Performed by: RADIOLOGY

## 2024-05-03 PROCEDURE — 76775 US EXAM ABDO BACK WALL LIM: CPT | Mod: TC

## 2024-05-03 PROCEDURE — 77080 DXA BONE DENSITY AXIAL: CPT | Mod: TC

## 2024-05-03 PROCEDURE — 76536 US EXAM OF HEAD AND NECK: CPT | Mod: TC

## 2024-05-03 PROCEDURE — 76775 US EXAM ABDO BACK WALL LIM: CPT | Mod: 26,,, | Performed by: STUDENT IN AN ORGANIZED HEALTH CARE EDUCATION/TRAINING PROGRAM

## 2024-05-07 ENCOUNTER — TELEPHONE (OUTPATIENT)
Dept: NEPHROLOGY | Facility: CLINIC | Age: 79
End: 2024-05-07
Payer: MEDICARE

## 2024-05-07 DIAGNOSIS — R76.8 ANA POSITIVE: Primary | ICD-10-CM

## 2024-05-07 NOTE — TELEPHONE ENCOUNTER
----- Message from Radha West DO sent at 5/7/2024 11:14 AM CDT -----  I spoke to her regarding her lab results. She has a positive GAYLE. She would like to refer her to the NP at [facility] for this. Please send referral for GAYLE Positive.

## 2024-05-27 PROBLEM — Z13.820 SCREENING FOR OSTEOPOROSIS: Status: RESOLVED | Noted: 2024-02-26 | Resolved: 2024-05-27

## 2024-05-31 ENCOUNTER — TELEPHONE (OUTPATIENT)
Dept: PULMONOLOGY | Facility: CLINIC | Age: 79
End: 2024-05-31
Payer: MEDICARE

## 2024-05-31 NOTE — TELEPHONE ENCOUNTER
Patient had labs done on 4/2024, forward the message to Dr. Barnes to review and advise if more labs are needed.       ----- Message from Gwen Hairston sent at 5/31/2024  8:10 AM CDT -----  Regarding: LAB ORDERS  PATIENT CALLING ABOUT SOME LAB ORDERS THAT WERE TO BE PUT IN, CALL BACK NUMBER [phone]. SHE WOULD LIKE TO COME TODAY TO DO HER LAB, PLEASE CALL THE PATIENT

## 2024-06-03 ENCOUNTER — TELEPHONE (OUTPATIENT)
Dept: NEPHROLOGY | Facility: CLINIC | Age: 79
End: 2024-06-03
Payer: MEDICARE

## 2024-06-03 DIAGNOSIS — N17.9 AKI (ACUTE KIDNEY INJURY): ICD-10-CM

## 2024-06-03 DIAGNOSIS — R76.8 ANA POSITIVE: ICD-10-CM

## 2024-06-05 ENCOUNTER — OFFICE VISIT (OUTPATIENT)
Dept: PULMONOLOGY | Facility: CLINIC | Age: 79
End: 2024-06-05
Payer: MEDICARE

## 2024-06-05 VITALS
RESPIRATION RATE: 18 BRPM | WEIGHT: 116.63 LBS | SYSTOLIC BLOOD PRESSURE: 130 MMHG | HEART RATE: 74 BPM | HEIGHT: 64 IN | BODY MASS INDEX: 19.91 KG/M2 | OXYGEN SATURATION: 99 % | DIASTOLIC BLOOD PRESSURE: 60 MMHG

## 2024-06-05 DIAGNOSIS — M60.9 MYOSITIS, UNSPECIFIED MYOSITIS TYPE, UNSPECIFIED SITE: ICD-10-CM

## 2024-06-05 DIAGNOSIS — R06.02 SHORTNESS OF BREATH: Primary | ICD-10-CM

## 2024-06-05 DIAGNOSIS — E78.00 ELEVATED CHOLESTEROL: ICD-10-CM

## 2024-06-05 DIAGNOSIS — Z86.73 HX OF TIA (TRANSIENT ISCHEMIC ATTACK) AND STROKE: ICD-10-CM

## 2024-06-05 PROCEDURE — 99214 OFFICE O/P EST MOD 30 MIN: CPT | Mod: PBBFAC | Performed by: STUDENT IN AN ORGANIZED HEALTH CARE EDUCATION/TRAINING PROGRAM

## 2024-06-05 PROCEDURE — 99213 OFFICE O/P EST LOW 20 MIN: CPT | Mod: S$PBB,,, | Performed by: STUDENT IN AN ORGANIZED HEALTH CARE EDUCATION/TRAINING PROGRAM

## 2024-06-05 RX ORDER — KETOROLAC TROMETHAMINE 5 MG/ML
1 SOLUTION OPHTHALMIC ONCE
COMMUNITY
Start: 2024-05-31

## 2024-06-05 RX ORDER — PREDNISOLONE ACETATE 10 MG/ML
1 SUSPENSION/ DROPS OPHTHALMIC 2 TIMES DAILY
COMMUNITY
Start: 2024-05-31

## 2024-06-05 RX ORDER — MOXIFLOXACIN 5 MG/ML
1 SOLUTION/ DROPS OPHTHALMIC ONCE
COMMUNITY
Start: 2024-05-31

## 2024-06-05 NOTE — PROGRESS NOTES
[facility]  Pulmonology  ESTABLISHED VISIT     Patient Name:  Shelbi May  Primary Care Provider: Guzman Caicedo DO  Date of Service: 06/05/2024      Chief Complaint:  Shortness of breath    SUBJECTIVE   HPI:  Shelbi May is a 79 y.o. female with prior hx of TIA who presents today for follow up of shortness of breath. Last seen 04/2024 with plan for PFT and 6MWT.    Lalo reports interval improvement in her shortness of breath. She adds that she stopped taking her statin and has noticed an improvement in her muscle fatigue as well. She has had no interval presentations to ED or hospitalizations. She has since established with Nephrology and has ongoing evaluation for her renal insufficiency; notably, GAYLE and anti-dsDNA has been elevated on labs.     Initial HPI  Lalo reports having shortness of breath for about 1 year with progression in the past 6 months.  She characterizes it as shortness of breath within walking through her own home.  She has to stop to catch her breath.  She was also noticed shortness of breath with arm raising.  She has HCTZ at home that she takes on occasion for hand swelling. She has a cough that is at times productive of yellow phlegm. She has ongoing congestion that is year round that she localizes in her maxillary sinus.  She intermittently uses Flonase for this. She has some back pain for which she takes once daily Aleve for her symptoms.    Past Medical History:   Diagnosis Date    Allergy     Cancer     colon cancer no treatment     Depression     Diverticula, colon 05/17/2021    History of colon cancer, stage I 05/17/2021    Hyperlipidemia     Stroke        Past Surgical History:   Procedure Laterality Date    TONSILLECTOMY         Family History   Problem Relation Name Age of Onset    Hypertension Mother      Heart disease Father      Cancer Brother          Social History     Socioeconomic History    Marital status: Unknown   Tobacco Use    Smoking status:  "Never    Smokeless tobacco: Never   Substance and Sexual Activity    Alcohol use: Never    Drug use: Never    Sexual activity: Yes     Partners: Male       Social History     Social History Narrative    Not on file       Review of patient's allergies indicates:  No Known Allergies     Medications: Medications reviewed to include over the counter medications.    Review of Systems: A focused ROS was completed and found to be negative except for that mentioned above.      OBJECTIVE   PHYSICAL EXAM:  Vitals:    06/05/24 1049   BP: 130/60   BP Location: Left arm   Patient Position: Sitting   BP Method: Small (Manual)   Pulse: 74   Resp: 18   SpO2: 99%   Weight: 52.9 kg (116 lb 9.6 oz)   Height: 5' 4" (1.626 m)          GENERAL: NAD  HEENT: normocephalic, non-icteric conjunctivae, moist oral mucosa  RESPIRATORY: clear to auscultation, no wheezing, rales or rhonchi  CARDIOVASCULAR: Regular rate and rhythm, no murmurs rubs or gallops.  SKIN: no rash, jaundice, ecchymosis or ulcers  MUSCULOSKELETAL: No clubbing or cyanosis; no pedal edema  NEUROLOGIC: AO ×3, no gross deficits    LABS:  Lab studies reviewed and notable for H/H 13.4/40.2, MCV 93.2, CO2 31, SCr 1.45, NTproBNP 861 (03/2024)    IMAGING:  Imaging reviewed and notable for CT Chest 12/2018 with chronic biapical scarring, no emphysema, no nodules or consolidation, prominent mediastinal adenopathy, no pleural effusion, normal appearing bilateral kidneys present. CXR 03/2024 with clear lung fields bilaterally, hyperinflation and narrowed mediastinum    TTE:   04/2024:  TTE with LVEF 55-60%, normal diastolic function, normal RV size, TAPSE 1.96, mild LA dilation, normal RA size, trace MR, mild TR, PASP 28    LUNG FUNCTION TESTING:   SPIROMETRY/PFT:  04/2024 Pre Post   FVC 2.36/-0.52 2.37/-0.50   FEV1 1.86/-0.37 1.91/-0.23   FEV1/FVC 79/0.25 81/0.51   TLC 4.89/-0.29    FRC  3.59/1.02    RV 2.51/0.32    DLCO 12.76/-2.27      6MWD:  Date Distance (ft) Resting SpO2; Fernando " SpO2 O2 Required   04/2024 1064 99%,RA; 96% none           Spirometry is normal.  There is a significant increase in LCM09-27 post-BD that may represent small airway disease.   The flow volume loop is normal.  There is no restrictive defect.  There may be hyperinflation present with %pred, however, remains within normal limits per ULN cutoff.  There is a mild diffusion defect. Decreased diffusion capacity is suggestive of pulmonary vascular disease, emphysema, interstitial lung disease and/or anemia. Clinical correlation is advised.   Good quality study.    ASSESSMENT & PLAN     1. Shortness of breath  Assessment & Plan:  80 yo F lifelong non smoker with smoking exposure presents for follow up with persistent episodic exertional dyspnea. Review of prior records with imaging notable for mediastinal LAD and otherwise clear lung fields. CXR with clear lung fields 03/2024 and TTE with no functional or significant valvular dysfunction. PFTs notable for solitary reduction in DLCO and 6MWT with good functional capacity.   - agree with cessation of statin, particularly with improvement in myalgia; to f/u with PCP for consideration of alternative therapy and obtain lipid panel in mean time  - obtain CT Chest to complete pulmonary evaluation for isolated DLCO reduction    Orders:  -     CT Chest Without Contrast; Future; Expected date: 06/05/2024    2. Myositis, unspecified myositis type, unspecified site    3. Hx of TIA (transient ischemic attack) and stroke    4. Elevated cholesterol  -     Lipid Panel; Future; Expected date: 09/05/2024             Follow up in about 3 months (around 9/5/2024).      Case was discussed with patient; all questions were answered to patient's satisfaction and patient verbalized understanding.       Shawna Barnes MD  Pulmonary Medicine  [facility]  Phone: [phone]

## 2024-06-05 NOTE — PROGRESS NOTES
Patient was called to update on latest imaging results. Patient was informed about the abnormal findings. Relevant questions to the results were addressed. Patient verbalized to have understood the information provided..

## 2024-06-06 NOTE — ASSESSMENT & PLAN NOTE
80 yo F lifelong non smoker with smoking exposure presents for follow up with persistent episodic exertional dyspnea. Review of prior records with imaging notable for mediastinal LAD and otherwise clear lung fields. CXR with clear lung fields 03/2024 and TTE with no functional or significant valvular dysfunction. PFTs notable for solitary reduction in DLCO and 6MWT with good functional capacity.   - agree with cessation of statin, particularly with improvement in myalgia; to f/u with PCP for consideration of alternative therapy and obtain lipid panel in mean time  - obtain CT Chest to complete pulmonary evaluation for isolated DLCO reduction

## 2024-06-17 ENCOUNTER — TELEPHONE (OUTPATIENT)
Dept: NEPHROLOGY | Facility: CLINIC | Age: 79
End: 2024-06-17
Payer: MEDICARE

## 2024-06-17 NOTE — TELEPHONE ENCOUNTER
----- Message from Radha West DO sent at 6/17/2024  2:27 PM CDT -----  Renal function is stable. She has a positive GAYLE. We referred her to the NP at [facility] for this. No changes TY

## 2024-06-17 NOTE — TELEPHONE ENCOUNTER
MagdalenoLalo returned call, she is aware of her lab work. Appt scheduled for Humaira Martini @ [facility] 6/26/24 @ 1:45.

## 2024-07-01 PROBLEM — N17.9 AKI (ACUTE KIDNEY INJURY): Status: RESOLVED | Noted: 2024-03-28 | Resolved: 2024-07-01

## 2024-07-09 DIAGNOSIS — Z71.89 COMPLEX CARE COORDINATION: ICD-10-CM

## 2024-08-08 ENCOUNTER — OFFICE VISIT (OUTPATIENT)
Dept: NEPHROLOGY | Facility: CLINIC | Age: 79
End: 2024-08-08
Payer: MEDICARE

## 2024-08-08 VITALS
RESPIRATION RATE: 16 BRPM | DIASTOLIC BLOOD PRESSURE: 72 MMHG | HEIGHT: 64 IN | SYSTOLIC BLOOD PRESSURE: 130 MMHG | BODY MASS INDEX: 19.94 KG/M2 | HEART RATE: 80 BPM | OXYGEN SATURATION: 97 % | WEIGHT: 116.81 LBS

## 2024-08-08 DIAGNOSIS — R80.9 PROTEINURIA DUE TO TYPE 2 DIABETES MELLITUS: ICD-10-CM

## 2024-08-08 DIAGNOSIS — R79.89 ELEVATED BRAIN NATRIURETIC PEPTIDE (BNP) LEVEL: Primary | ICD-10-CM

## 2024-08-08 DIAGNOSIS — E11.29 PROTEINURIA DUE TO TYPE 2 DIABETES MELLITUS: ICD-10-CM

## 2024-08-08 PROCEDURE — 99214 OFFICE O/P EST MOD 30 MIN: CPT | Mod: PBBFAC | Performed by: NURSE PRACTITIONER

## 2024-08-08 PROCEDURE — 99213 OFFICE O/P EST LOW 20 MIN: CPT | Mod: S$PBB,,, | Performed by: NURSE PRACTITIONER

## 2024-08-08 PROCEDURE — 99999 PR PBB SHADOW E&M-EST. PATIENT-LVL IV: CPT | Mod: PBBFAC,,, | Performed by: NURSE PRACTITIONER

## 2024-08-09 DIAGNOSIS — N39.0 UTI (URINARY TRACT INFECTION), UNCOMPLICATED: Primary | ICD-10-CM

## 2024-08-09 RX ORDER — CIPROFLOXACIN 500 MG/1
500 TABLET ORAL 2 TIMES DAILY
Qty: 14 TABLET | Refills: 0 | Status: SHIPPED | OUTPATIENT
Start: 2024-08-09 | End: 2024-08-16

## 2024-08-09 RX ORDER — FLUCONAZOLE 150 MG/1
150 TABLET ORAL DAILY
Qty: 1 TABLET | Refills: 0 | Status: SHIPPED | OUTPATIENT
Start: 2024-08-09 | End: 2024-08-10

## 2024-08-27 ENCOUNTER — OFFICE VISIT (OUTPATIENT)
Dept: CARDIOLOGY | Facility: CLINIC | Age: 79
End: 2024-08-27
Payer: MEDICARE

## 2024-08-27 VITALS
DIASTOLIC BLOOD PRESSURE: 80 MMHG | WEIGHT: 117.19 LBS | BODY MASS INDEX: 20.01 KG/M2 | SYSTOLIC BLOOD PRESSURE: 126 MMHG | HEART RATE: 64 BPM | HEIGHT: 64 IN | OXYGEN SATURATION: 96 %

## 2024-08-27 DIAGNOSIS — M54.17 LUMBOSACRAL RADICULOPATHY: Primary | ICD-10-CM

## 2024-08-27 DIAGNOSIS — R79.89 ELEVATED BRAIN NATRIURETIC PEPTIDE (BNP) LEVEL: ICD-10-CM

## 2024-08-27 DIAGNOSIS — R06.02 SHORTNESS OF BREATH: ICD-10-CM

## 2024-08-27 PROCEDURE — 93010 ELECTROCARDIOGRAM REPORT: CPT | Mod: S$PBB,,, | Performed by: STUDENT IN AN ORGANIZED HEALTH CARE EDUCATION/TRAINING PROGRAM

## 2024-08-27 PROCEDURE — 93005 ELECTROCARDIOGRAM TRACING: CPT | Mod: PBBFAC | Performed by: STUDENT IN AN ORGANIZED HEALTH CARE EDUCATION/TRAINING PROGRAM

## 2024-08-27 PROCEDURE — 99999 PR PBB SHADOW E&M-EST. PATIENT-LVL V: CPT | Mod: PBBFAC,,, | Performed by: STUDENT IN AN ORGANIZED HEALTH CARE EDUCATION/TRAINING PROGRAM

## 2024-08-27 PROCEDURE — 99215 OFFICE O/P EST HI 40 MIN: CPT | Mod: PBBFAC,25 | Performed by: STUDENT IN AN ORGANIZED HEALTH CARE EDUCATION/TRAINING PROGRAM

## 2024-08-27 PROCEDURE — 99204 OFFICE O/P NEW MOD 45 MIN: CPT | Mod: S$PBB,,, | Performed by: STUDENT IN AN ORGANIZED HEALTH CARE EDUCATION/TRAINING PROGRAM

## 2024-08-27 NOTE — PROGRESS NOTES
PCP: Guzman Caicedo DO    Referring Provider: Jaky De Santiago, FNP  [address] [place] [address]    Reason for Referral: Dyspnea on minimal exertion, proBNP elevation    Subjective:   Shelbi May is a 79 y.o. female with hx of prior CVA 10 years ago and renal insufficiency/ CKD who presents for a new patient visit.     Patient is referred for evaluation of dyspnea on exertion.  She endorses dyspnea with minimal exertion for the last 6 months.  She notes she gets short of breath with walking 25 ft in her home.  She endorses palpitations with exertion.  ProBNP 872 April 2024.  Echo personally re-reviewed-> normal biventricular function no significant valvular disease to explain dyspnea with minimal exertion.  Patient had cardiac catheterization in October 2020 with angiographically normal coronaries.  Denies chest pain or syncope.  Endorses longstanding dizziness secondary to vertigo.    Fhx:  Father (heart disease)  Shx: Never smoker     EKG 08/27/2024: Normal sinus rhythm, rightward axis  ECHO Results for orders placed during the hospital encounter of 04/12/24    Echo Saline Bubble? No    Interpretation Summary    Left Ventricle: There is normal systolic function with a visually estimated ejection fraction of 55 - 60%. Biplane (2D) method of discs ejection fraction is 57%. Global longitudinal strain is -15.8%. Global longitudinal strain is reduced. There is normal diastolic function.    Right Ventricle: Normal right ventricular cavity size. Systolic function is normal.    Left Atrium: Left atrium is mildly dilated.    Aortic Valve: The aortic valve is a trileaflet valve.    Tricuspid Valve: There is mild regurgitation.    Pulmonary Artery: The estimated pulmonary artery systolic pressure is 28 mmHg.    IVC/SVC: Normal venous pressure at 3 mmHg.     CATH:  Coronary angiography 10/22/2020 personally reviewed:  Angiographically normal coronaries     Lab Results   Component Value Date     08/08/2024    K  "4.1 08/08/2024     08/08/2024    CO2 31 08/08/2024    BUN 33 (H) 08/08/2024    CREATININE 2.00 (H) 08/08/2024    CALCIUM 9.7 08/08/2024    ANIONGAP 11 08/08/2024    ESTGFRAFRICA 63 10/20/2020    EGFRNONAA 60 07/22/2021       No results found for: "CHOL"  No results found for: "HDL"  No results found for: "LDLCALC"  No results found for: "TRIG"  No results found for: "CHOLHDL"    Lab Results   Component Value Date    WBC 5.35 08/08/2024    HGB 13.2 08/08/2024    HCT 40.0 08/08/2024    MCV 95.0 08/08/2024     (L) 08/08/2024           Current Outpatient Medications:     acetaminophen (TYLENOL) 500 MG tablet, Take 500 mg by mouth every 6 (six) hours as needed for Pain., Disp: , Rfl:     aspirin (ECOTRIN) 81 MG EC tablet, Take 81 mg by mouth once daily., Disp: , Rfl:     buPROPion (WELLBUTRIN XL) 300 MG 24 hr tablet, Take 1 tablet (300 mg total) by mouth once daily., Disp: 90 tablet, Rfl: 3    fluticasone propionate (FLONASE) 50 mcg/actuation nasal spray, 2 sprays (100 mcg total) by Each Nostril route once daily., Disp: 15.8 mL, Rfl: 1    ketorolac 0.5% (ACULAR) 0.5 % Drop, Place 1 drop into both eyes once., Disp: , Rfl:     loratadine (CLARITIN) 10 mg tablet, Take 10 mg by mouth once daily., Disp: , Rfl:     meclizine (ANTIVERT) 25 mg tablet, Take 1 tablet (25 mg total) by mouth 3 (three) times daily as needed for Dizziness., Disp: 90 tablet, Rfl: 0    moxifloxacin (VIGAMOX) 0.5 % ophthalmic solution, Place 1 drop into both eyes once., Disp: , Rfl:     prednisoLONE acetate (PRED FORTE) 1 % DrpS, Place 1 drop into both eyes 2 (two) times daily., Disp: , Rfl:     Review of Systems   Constitutional:  Negative for chills, diaphoresis, fever and malaise/fatigue.   Respiratory:  Positive for shortness of breath. Negative for cough.    Cardiovascular:  Negative for chest pain, palpitations, orthopnea, claudication, leg swelling and PND.   Gastrointestinal:  Negative for abdominal pain, heartburn, nausea and " "vomiting.   Neurological:  Negative for dizziness.       Objective:   /80 (BP Location: Left arm, Patient Position: Sitting)   Pulse 64   Ht 5' 4" (1.626 m)   Wt 53.2 kg (117 lb 3.2 oz)   SpO2 96%   BMI 20.12 kg/m²     Physical Exam  Constitutional:       General: She is not in acute distress.     Appearance: Normal appearance.   Cardiovascular:      Rate and Rhythm: Normal rate and regular rhythm.      Pulses: Normal pulses.      Heart sounds: Normal heart sounds. No murmur heard.     No friction rub. No gallop.   Pulmonary:      Effort: Pulmonary effort is normal.      Breath sounds: Normal breath sounds. No wheezing or rales.   Musculoskeletal:      Right lower leg: No edema.      Left lower leg: No edema.   Skin:     General: Skin is warm and dry.   Neurological:      Mental Status: She is alert.           Assessment:     1. Lumbosacral radiculopathy  Ambulatory referral/consult to Spine Surgery      2. Shortness of breath  EKG 12-lead    EKG 12-lead      3. Elevated brain natriuretic peptide (BNP) level  Ambulatory referral/consult to Cardiology            Plan:   No problem-specific Assessment & Plan notes found for this encounter.    Shortness of breath with mild exertion  Echocardiogram with normal biventricular function and no significant valvular disease to explain dyspnea   Cardiac catheterization was done graphically normal coronaries   Mild proBNP elevation may be secondary to renal insufficiency  - Explore noncardiac etiology of dyspnea    Lumbosacral radiculopathy  - endorses chronic low back pain and paresthesias  - referred to spine surgery    Follow-up in 3 months   "

## 2024-08-28 LAB
OHS QRS DURATION: 94 MS
OHS QTC CALCULATION: 440 MS

## 2024-09-05 ENCOUNTER — HOSPITAL ENCOUNTER (OUTPATIENT)
Dept: RADIOLOGY | Facility: HOSPITAL | Age: 79
Discharge: HOME | End: 2024-09-05
Attending: STUDENT IN AN ORGANIZED HEALTH CARE EDUCATION/TRAINING PROGRAM
Payer: MEDICARE

## 2024-09-05 ENCOUNTER — OFFICE VISIT (OUTPATIENT)
Dept: PULMONOLOGY | Facility: CLINIC | Age: 79
End: 2024-09-05
Payer: MEDICARE

## 2024-09-05 VITALS
DIASTOLIC BLOOD PRESSURE: 70 MMHG | WEIGHT: 117 LBS | OXYGEN SATURATION: 100 % | HEIGHT: 64 IN | HEART RATE: 72 BPM | SYSTOLIC BLOOD PRESSURE: 140 MMHG | RESPIRATION RATE: 18 BRPM | BODY MASS INDEX: 19.97 KG/M2

## 2024-09-05 DIAGNOSIS — M54.40 CHRONIC LOW BACK PAIN WITH SCIATICA, SCIATICA LATERALITY UNSPECIFIED, UNSPECIFIED BACK PAIN LATERALITY: Primary | ICD-10-CM

## 2024-09-05 DIAGNOSIS — R06.02 SHORTNESS OF BREATH: ICD-10-CM

## 2024-09-05 DIAGNOSIS — G89.29 CHRONIC LOW BACK PAIN WITH SCIATICA, SCIATICA LATERALITY UNSPECIFIED, UNSPECIFIED BACK PAIN LATERALITY: Primary | ICD-10-CM

## 2024-09-05 PROCEDURE — 99214 OFFICE O/P EST MOD 30 MIN: CPT | Mod: S$PBB,,, | Performed by: STUDENT IN AN ORGANIZED HEALTH CARE EDUCATION/TRAINING PROGRAM

## 2024-09-05 PROCEDURE — 71250 CT THORAX DX C-: CPT | Mod: 26,,, | Performed by: RADIOLOGY

## 2024-09-05 PROCEDURE — 99999 PR PBB SHADOW E&M-EST. PATIENT-LVL V: CPT | Mod: PBBFAC,,, | Performed by: STUDENT IN AN ORGANIZED HEALTH CARE EDUCATION/TRAINING PROGRAM

## 2024-09-05 PROCEDURE — 99215 OFFICE O/P EST HI 40 MIN: CPT | Mod: PBBFAC,25 | Performed by: STUDENT IN AN ORGANIZED HEALTH CARE EDUCATION/TRAINING PROGRAM

## 2024-09-05 PROCEDURE — 71250 CT THORAX DX C-: CPT | Mod: TC

## 2024-09-05 RX ORDER — TRAMADOL HYDROCHLORIDE 50 MG/1
50 TABLET ORAL EVERY 8 HOURS PRN
Qty: 20 TABLET | Refills: 0 | Status: SHIPPED | OUTPATIENT
Start: 2024-09-05 | End: 2024-10-05

## 2024-09-05 NOTE — ASSESSMENT & PLAN NOTE
78 yo F lifelong non smoker with smoking exposure presents for follow up with persistent episodic exertional dyspnea. Review of prior records with imaging notable for mediastinal LAD and otherwise clear lung fields. CXR with clear lung fields 03/2024 and TTE with no functional or significant valvular dysfunction. PFTs notable for solitary reduction in DLCO and 6MWT with good functional capacity.  She presents today after completion of CT chest which is stable in identical to previous imaging in 2018.  Given association of her shortness of breath with episodes of lower back pain, high on my differential is pain related dyspnea with a nonpulmonary and now noncardiac etiology established.  Continued follow-up with Nephrology for CKD management.  She has upcoming referral to spine for her back pain management.  In the meantime we will manage her back pain with tramadol as outlined above.  - CT chest is very reassuring and is stable when compared to testing in 2018; management of lower back pain

## 2024-09-05 NOTE — PATIENT INSTRUCTIONS
Tab by taking tramadol 50 mg tonight while before bedtime.  Monitor for sleepiness.  He can take tramadol every 12 hours for the 1st 3 days as needed for back pain.  Please give us a call back to discuss your pain management in 1 week.

## 2024-09-05 NOTE — PROGRESS NOTES
[facility]  Pulmonology  ESTABLISHED VISIT     Patient Name:  Shelbi May  Primary Care Provider: Guzman Caicedo DO  Date of Service: 09/05/2024      Chief Complaint:  Shortness of breath    SUBJECTIVE   HPI:  Shelbi May is a 79 y.o. female with prior hx of TIA who presents today for follow up of shortness of breath. Last seen 04/2024 with plan for PFT and 6MWT.    Lalo reports stable shortness of breath that comes in paroxysms and always associated with her back pain.  Her back pain is poorly controlled at this time she is avoiding NSAIDs with a new diagnosis of CKD.  She has back pain that is persistent and involves her neck and lower back.  Her lower back pain has a quality that requires her to sit down due to severity of the discomfort that is accompanied with shortness of breath.  She was no cough.  We reviewed the results of her CT chest completed today.    Initial HPI  Lalo reports having shortness of breath for about 1 year with progression in the past 6 months.  She characterizes it as shortness of breath within walking through her own home.  She has to stop to catch her breath.  She was also noticed shortness of breath with arm raising.  She has HCTZ at home that she takes on occasion for hand swelling. She has a cough that is at times productive of yellow phlegm. She has ongoing congestion that is year round that she localizes in her maxillary sinus.  She intermittently uses Flonase for this. She has some back pain for which she takes once daily Aleve for her symptoms.  06/2024: reports interval improvement in her shortness of breath. She adds that she stopped taking her statin and has noticed an improvement in her muscle fatigue as well. She has had no interval presentations to ED or hospitalizations. She has since established with Nephrology and has ongoing evaluation for her renal insufficiency; notably, GAYLE and anti-dsDNA has been elevated on labs.     Past Medical History:    Diagnosis Date    Age-related osteoporosis without current pathological fracture 02/26/2024    Allergy     Cancer     colon cancer no treatment     Depression     Diverticula, colon 05/17/2021    History of colon cancer, stage I 05/17/2021    Hyperlipidemia     Stroke        Past Surgical History:   Procedure Laterality Date    TONSILLECTOMY         Family History   Problem Relation Name Age of Onset    Hypertension Mother      Heart disease Father      Cancer Brother          Social History     Socioeconomic History    Marital status: Unknown   Tobacco Use    Smoking status: Never    Smokeless tobacco: Never   Substance and Sexual Activity    Alcohol use: Never    Drug use: Never    Sexual activity: Yes     Partners: Male     Social Determinants of Health     Financial Resource Strain: Low Risk  (8/26/2024)    Overall Financial Resource Strain (CARDIA)     Difficulty of Paying Living Expenses: Not hard at all   Food Insecurity: No Food Insecurity (8/26/2024)    Hunger Vital Sign     Worried About Running Out of Food in the Last Year: Never true     Ran Out of Food in the Last Year: Never true   Physical Activity: Unknown (8/26/2024)    Exercise Vital Sign     Days of Exercise per Week: 0 days   Stress: No Stress Concern Present (8/26/2024)    [employer] - Occupational Stress Questionnaire     Feeling of Stress : Only a little   Housing Stability: Unknown (8/26/2024)    Housing Stability Vital Sign     Unable to Pay for Housing in the Last Year: No       Social History     Social History Narrative    Not on file       Review of patient's allergies indicates:  No Known Allergies     Medications: Medications reviewed to include over the counter medications.    Review of Systems: A focused ROS was completed and found to be negative except for that mentioned above.      OBJECTIVE   PHYSICAL EXAM:  Vitals:    09/05/24 1303   BP: (!) 140/70   BP Location: Left arm   Patient Position:  "Sitting   BP Method: Small (Manual)   Pulse: 72   Resp: 18   SpO2: 100%   Weight: 53.1 kg (117 lb)   Height: 5' 4" (1.626 m)            GENERAL: NAD  HEENT: normocephalic, non-icteric conjunctivae, moist oral mucosa  RESPIRATORY: clear to auscultation, no wheezing, rales or rhonchi  CARDIOVASCULAR: Regular rate and rhythm, no murmurs rubs or gallops.  SKIN: no rash, jaundice, ecchymosis or ulcers  MUSCULOSKELETAL: No clubbing or cyanosis; no pedal edema  NEUROLOGIC: AO ×3, no gross deficits    LABS:  Lab studies reviewed and notable for H/H 13.4/40.2, MCV 93.2, CO2 31, SCr 1.45, NTproBNP 861 (03/2024)    IMAGING:  Imaging reviewed and notable for CT Chest 12/2018 with chronic biapical scarring, no emphysema, no nodules or consolidation, prominent mediastinal adenopathy, no pleural effusion, normal appearing bilateral kidneys present. CXR 03/2024 with clear lung fields bilaterally, hyperinflation and narrowed mediastinum. CT Chest 09/2024 with stable biapical scarring, no emphysema, no nodules, no pleural effusion; when compared to CT from 2018 it is stable    TTE:   04/2024:  TTE with LVEF 55-60%, normal diastolic function, normal RV size, TAPSE 1.96, mild LA dilation, normal RA size, trace MR, mild TR, PASP 28    LUNG FUNCTION TESTING:   SPIROMETRY/PFT:  04/2024 Pre Post   FVC 2.36/-0.52 2.37/-0.50   FEV1 1.86/-0.37 1.91/-0.23   FEV1/FVC 79/0.25 81/0.51   TLC 4.89/-0.29    FRC  3.59/1.02    RV 2.51/0.32    DLCO 12.76/-2.27      6MWD:  Date Distance (ft) Resting SpO2; Fernando SpO2 O2 Required   04/2024 1064 99%,RA; 96% none           Spirometry is normal.  There is a significant increase in [id number] post-BD that may represent small airway disease.   The flow volume loop is normal.  There is no restrictive defect.  There may be hyperinflation present with %pred, however, remains within normal limits per ULN cutoff.  There is a mild diffusion defect. Decreased diffusion capacity is suggestive of pulmonary " vascular disease, emphysema, interstitial lung disease and/or anemia. Clinical correlation is advised.   Good quality study.    ASSESSMENT & PLAN     1. Chronic low back pain with sciatica, sciatica laterality unspecified, unspecified back pain laterality  Assessment & Plan:  She has received a referral to spine surgery that is upcoming in 10/2024.  We will plan on treatment of her back pain with tramadol.  Education on 0 PH safety discussed during this visit.  Tramadol 50 mg to start q.12 hours and thereafter can increase frequency as needed for lower back pain.    Orders:  -     traMADoL (ULTRAM) 50 mg tablet; Take 1 tablet (50 mg total) by mouth every 8 (eight) hours as needed for Pain. Take the first dose of this medication at home. Do not operate heavy machinery after taking this medication.  Dispense: 20 tablet; Refill: 0    2. Shortness of breath  Assessment & Plan:  78 yo F lifelong non smoker with smoking exposure presents for follow up with persistent episodic exertional dyspnea. Review of prior records with imaging notable for mediastinal LAD and otherwise clear lung fields. CXR with clear lung fields 03/2024 and TTE with no functional or significant valvular dysfunction. PFTs notable for solitary reduction in DLCO and 6MWT with good functional capacity.  She presents today after completion of CT chest which is stable in identical to previous imaging in 2018.  Given association of her shortness of breath with episodes of lower back pain, high on my differential is pain related dyspnea with a nonpulmonary and now noncardiac etiology established.  Continued follow-up with Nephrology for CKD management.  She has upcoming referral to spine for her back pain management.  In the meantime we will manage her back pain with tramadol as outlined above.  - CT chest is very reassuring and is stable when compared to testing in 2018; management of lower back pain                 Follow up if symptoms worsen or fail to  improve.      Case was discussed with patient; all questions were answered to patient's satisfaction and patient verbalized understanding.       Shawna Barnes MD  Pulmonary Medicine  [facility]  Phone: [phone]

## 2024-09-05 NOTE — ASSESSMENT & PLAN NOTE
She has received a referral to spine surgery that is upcoming in 10/2024.  We will plan on treatment of her back pain with tramadol.  Education on 0 PH safety discussed during this visit.  Tramadol 50 mg to start q.12 hours and thereafter can increase frequency as needed for lower back pain.

## 2024-09-18 DIAGNOSIS — G89.29 CHRONIC LOW BACK PAIN WITH SCIATICA, SCIATICA LATERALITY UNSPECIFIED, UNSPECIFIED BACK PAIN LATERALITY: ICD-10-CM

## 2024-09-18 DIAGNOSIS — M54.40 CHRONIC LOW BACK PAIN WITH SCIATICA, SCIATICA LATERALITY UNSPECIFIED, UNSPECIFIED BACK PAIN LATERALITY: ICD-10-CM

## 2024-09-18 NOTE — TELEPHONE ENCOUNTER
----- Message from Gersondanny Avendanofield sent at 9/18/2024 11:43 AM CDT -----  Regarding: Refill  Who Called: Shelbi May    Refill or New Rx:Refill  RX Name and Strength:traMADoL (ULTRAM) 50 mg tablet  How is the patient currently taking it? (ex. 1XDay): Take 1 tablet (50 mg total) by mouth every 8 (eight) hours as needed for Pain. Take the first dose of this medication at home. Do not operate heavy machinery after taking this medication.     List of preferred pharmacies on file (remove unneeded): [unfilled][facility] [address] [place] [address] [address] [place] [address]  If different Pharmacy is requested, enter Pharmacy information here including location and phone number:          Preferred Method of Contact: Phone Call  Patient's Preferred Phone Number on File: [phone]   Best Call Back Number, if different:  Additional Information: Pt would like a refill. Stated she was to call and let nurse know if rx worked for her. It does.

## 2024-09-20 ENCOUNTER — TELEPHONE (OUTPATIENT)
Dept: PULMONOLOGY | Facility: CLINIC | Age: 79
End: 2024-09-20
Payer: MEDICARE

## 2024-09-20 RX ORDER — TRAMADOL HYDROCHLORIDE 50 MG/1
50 TABLET ORAL EVERY 8 HOURS PRN
Qty: 20 TABLET | Refills: 0 | Status: SHIPPED | OUTPATIENT
Start: 2024-09-20 | End: 2024-10-20

## 2024-09-20 NOTE — TELEPHONE ENCOUNTER
----- Message from Brandy De La Rosa sent at 9/20/2024  8:26 AM CDT -----  Regarding: RX CALL BACK  Refill request.     traMADoL (ULTRAM) 50 mg tablet    PT CALL BACK : [phone]

## 2024-09-27 ENCOUNTER — HOSPITAL ENCOUNTER (OUTPATIENT)
Dept: RADIOLOGY | Facility: HOSPITAL | Age: 79
Discharge: HOME | End: 2024-09-27
Attending: OBSTETRICS & GYNECOLOGY
Payer: MEDICARE

## 2024-09-27 VITALS — WEIGHT: 118 LBS | HEIGHT: 64 IN | BODY MASS INDEX: 20.14 KG/M2

## 2024-09-27 DIAGNOSIS — Z12.31 OTHER SCREENING MAMMOGRAM: ICD-10-CM

## 2024-09-27 PROCEDURE — 77063 BREAST TOMOSYNTHESIS BI: CPT | Mod: 26,,, | Performed by: RADIOLOGY

## 2024-09-27 PROCEDURE — 77067 SCR MAMMO BI INCL CAD: CPT | Mod: 26,,, | Performed by: RADIOLOGY

## 2024-09-27 PROCEDURE — 77067 SCR MAMMO BI INCL CAD: CPT | Mod: TC

## 2024-10-02 DIAGNOSIS — M54.16 LUMBAR RADICULOPATHY, CHRONIC: Primary | ICD-10-CM

## 2024-10-04 ENCOUNTER — OFFICE VISIT (OUTPATIENT)
Dept: SPINE | Facility: CLINIC | Age: 79
End: 2024-10-04
Payer: MEDICARE

## 2024-10-04 ENCOUNTER — HOSPITAL ENCOUNTER (OUTPATIENT)
Dept: RADIOLOGY | Facility: HOSPITAL | Age: 79
Discharge: HOME | End: 2024-10-04
Attending: ORTHOPAEDIC SURGERY
Payer: MEDICARE

## 2024-10-04 VITALS — BODY MASS INDEX: 19.81 KG/M2 | WEIGHT: 116 LBS | HEIGHT: 64 IN

## 2024-10-04 DIAGNOSIS — M54.16 LUMBAR RADICULOPATHY, CHRONIC: ICD-10-CM

## 2024-10-04 DIAGNOSIS — M54.17 LUMBOSACRAL RADICULOPATHY: ICD-10-CM

## 2024-10-04 DIAGNOSIS — M54.12 CERVICAL RADICULOPATHY: ICD-10-CM

## 2024-10-04 DIAGNOSIS — M81.0 AGE-RELATED OSTEOPOROSIS WITHOUT CURRENT PATHOLOGICAL FRACTURE: ICD-10-CM

## 2024-10-04 DIAGNOSIS — M54.16 LUMBAR RADICULOPATHY, CHRONIC: Primary | ICD-10-CM

## 2024-10-04 PROCEDURE — 99999 PR PBB SHADOW E&M-EST. PATIENT-LVL IV: CPT | Mod: PBBFAC,,, | Performed by: ORTHOPAEDIC SURGERY

## 2024-10-04 PROCEDURE — 99214 OFFICE O/P EST MOD 30 MIN: CPT | Mod: PBBFAC,25 | Performed by: ORTHOPAEDIC SURGERY

## 2024-10-04 PROCEDURE — 72110 X-RAY EXAM L-2 SPINE 4/>VWS: CPT | Mod: TC

## 2024-10-04 PROCEDURE — 72110 X-RAY EXAM L-2 SPINE 4/>VWS: CPT | Mod: 26,,, | Performed by: ORTHOPAEDIC SURGERY

## 2024-10-04 RX ORDER — GABAPENTIN 100 MG/1
100 CAPSULE ORAL 3 TIMES DAILY
Qty: 90 CAPSULE | Refills: 11 | Status: SHIPPED | OUTPATIENT
Start: 2024-10-04 | End: 2025-10-04

## 2024-10-04 NOTE — PROGRESS NOTES
MDM/time:  45-50 minutes spent on this encounter including 15 minutes reviewing imaging and notes, 20 minutes with the patient, 10 minutes documentation    ASSESSMENT:  79 y.o. female with cervical and lumbar spondylosis with radiculopathy     PLAN:  Referral to [facility] - DEXA 2/2024 showed osteoporosis has not had treatment   MRI cervical and lumbar spine   Cervical spine flex/extension xray  Follow up after MRI       HPI:  79 y.o. female here for evaluation of neck pain and lower back pain that radiates into bilateral legs.  Patient reports a history of neck pain for the past few years has been seen in [place] by Dr. King for her cervical spine issues was prescribed physical therapy which did help her pain in the past she reports over the past 2 years her pain has gotten worse finds it difficult to do any type of bilateral arm movement due to neck pain and shortness of breath.  She also complains of lower back pain for the past year which worsens when she goes from a sitting to standing position has difficulty standing up straight and this pain is accompanied by shortness of breath as well.  Patient has had a workup for pulmonary and cardiac issues which all have resulted in no active disease.  She has also had a bone density 02/24/2024 that did show osteoporosis unfortunately she has not been treated for osteoporosis.  Patient denies difficulty with  strength.  Balance issues due to vertigo with no recent falls.  Denies bladder bowel incontinence.  Decreased walking tolerance due to pain in her back and shortness of breath.  Currently takes tramadol and Tylenol as needed for pain.  She has not had any recent physical therapy.  No prior pain management.  No recent MRI.  No prior spine surgery.  Patient is not a smoker.      IMAGING:  AP, lateral, flexion/extension views of the lumbar spine reviewed     On the AP there is normal coronal alignment.  There are 5 non-rib-bearing lumbar vertebrae.  On  the lateral there is decreased lumbar lordosis.  There is spondylotic disease with decreased disc height and osteophyte formation noted.  No fractures or listhesis noted.  No instability on flexion-extension views.     Impression:  Spondylotic changes of the lumbar spine as noted above    Past Medical History:   Diagnosis Date    Age-related osteoporosis without current pathological fracture 02/26/2024    Allergy     Cancer     colon cancer no treatment     Depression     Diverticula, colon 05/17/2021    History of colon cancer, stage I 05/17/2021    Hyperlipidemia     Stroke      Past Surgical History:   Procedure Laterality Date    TONSILLECTOMY       Social History     Tobacco Use    Smoking status: Never    Smokeless tobacco: Never   Substance Use Topics    Alcohol use: Never    Drug use: Never      Current Outpatient Medications   Medication Instructions    acetaminophen (TYLENOL) 500 mg, Every 6 hours PRN    aspirin (ECOTRIN) 81 mg, Daily    buPROPion (WELLBUTRIN XL) 300 mg, Oral, Daily    fluticasone propionate (FLONASE) 100 mcg, Each Nostril, Daily    ketorolac 0.5% (ACULAR) 0.5 % Drop 1 drop, Once    loratadine (CLARITIN) 10 mg, Daily    meclizine (ANTIVERT) 25 mg, Oral, 3 times daily PRN    moxifloxacin (VIGAMOX) 0.5 % ophthalmic solution 1 drop, Once    prednisoLONE acetate (PRED FORTE) 1 % DrpS 1 drop, 2 times daily    traMADoL (ULTRAM) 50 mg, Oral, Every 8 hours PRN, Take the first dose of this medication at home. Do not operate heavy machinery after taking this medication.        EXAM:  Constitutional  General Appearance:  Body mass index is 19.91 kg/m²., NAD  Psychiatric   Orientation: Oriented to time, oriented to place, oriented to person  Mood and Affect: Active and alert, normal mood, normal affect  Gait and Station   Appearance:  Normal gait, normal tandem gait, able to walk on toes, able to walk on heels    CERVICAL  Musculoskeletal System  Shoulder:  normal appearance, no instability, no  tenderness, normal ROM right, normal ROM left, no pain with ROM    Cervical Spine  Inspection:  alignment normal, no muscle atrophy  Soft Tissue Palpation on the Right:  no tenderness of the paracervicals, no tenderness of the trapezius, no tenderness of the rhomboid  Soft Tissue Palpation on the Left:  no tenderness of the paracervicals, no tenderness of the trapezius, no tenderness of the rhomboid  Bony Palpation:  no tenderness of the occipital protuberance  Active Range:     Flexion normal, Extension normal, Rotation to the left normal, Rotation to the right decreased, Lateral flexion to the left decreased, Lateral flexion to the right normal   Pain elicited on motion    Motor Strength  C5 on the right:  abduction deltoid 5/5  C5 on the left:  abduction deltoid 5/5  C6 on the Right:  flexion biceps 5/5, wrist extension 5/5  C6 on the Left:  flexion biceps 5/5, wrist extension 5/5  C7 on the Right:  extension fingers 5/5, extension triceps 5/5  C7 on the Left:  extension fingers 5/5, extension triceps 5/5  C8 on the Right:  flexion fingers 5/5  C8 on the Left:  flexion fingers 5/5  T1 on the Right:  abduction fingers 5/5  T1 on the Left:  abduction fingers 5/5    Neurological System  Sensation on the Right:  normal sensation of the extremities: right, normal median nerve distribution, normal ulnar nerve distribution  Sensation on the Left:  normal sensation of the extremities: left, normal median nerve distribution, normal ulnar nerve distribution  Biceps Reflex Right:  Hyperreflexia, Brachioradialis Reflex Right:  Hyperreflexia, Triceps Reflex Right:  Hyperreflexia  Biceps Reflex Left:  Hyperreflexia, Brachioradialis Reflex Left:  Hyperreflexia, Triceps Reflex Left:  Hyperreflexia  Special Test on the Right:  Spurlings test negative, Hoffmans reflex absent, no ankle clonus, Durkan test negative, Tinels sign negative at the elbow  Special Test on the Left:  Spurlings test negative, Hoffmans reflex absent, no  ankle clonus, Durkan test negative, Tinels sign negative at the elbow    Skin:   Head and Neck:  normal   Right Upper Extremity:  normal   Left Upper Extremity:  normal    Cardiovascular:   Arterial Pulses Right:  radial right   Arterial Pulses Left:  radial left   Edema Right:  none   Edema Left:  none    LUMBAR  Musculoskeletal System   Hips: Normal appearance, no leg length discrepancy, normal motion; left, normal motion; right    Lumbar Spine                   Inspection:  Normal alignment, normal sagittal balance                  Range of motion:  Decreased flexion, extension, lateral bending, rotation. Pain with range of motion                  Bony Palpation of the Lumbar Spine:  No tenderness of the spinous process, no tenderness of the sacrum, no tenderness of the coccyx                  Bony Palpation of the Right Hip:  No tenderness of the iliac crest, no tenderness of the sciatic notch, no tenderness of the SI joint                  Bony Palpation of the Left Hip:  No tenderness of the iliac crest, no tenderness of the sciatic notch, no tenderness of the SI joint                  Soft Tissue Palpation on the Right:  No tenderness of the paraspinal region, no tenderness of the iliolumbar region                  Soft Tissue Palpation on the Left:  No tenderness of the paraspinal region, no tenderness of the iliolumbar region    Motor Strength   L1 Right:  Hip flexion iliopsoas 5/5    L1 Left:  Hip flexion iliopsoas 5/5              L2-L4 Right:  Knee extension quadriceps 5/5, tibialis anterior 5/5              L2-L4 Left:  Knee extension quadriceps 5/5, tibialis anterior 5/5   L5 Right:  Extensor hallucis llongus 5/5,    L5 Left:  Extensor hallucis longus 5/5,    S1 Right:  Plantar flexion gastrocnemius 5/5   S1 Left:  Plantar flexion gastrocnemius 5/5    Neurological System   Ankle Reflex Right:  normal   Ankle Reflex Left: normal   Knee Reflex Right:  Hyperreflexia   Knee Reflex Left:   Hyperreflexia   Sensation on the Right:  L2 normal, L3 normal, L4 normal, L5 normal, S1 normal   Sensation on the Left:  L2 normal, L3 normal, L4 normal, L5 normal, S1 normal              Special Test on the Right:  Seated straight leg raising test negative, no clonus of the ankle              Special Test on the Left:  Seated straight leg raising test negative, no clonus of the ankle    Skin   Lumbosacral Spine:  Normal skin    Cardiovascular System   Arterial Pulses Right:  Posterior tibialis normal, dorsalis pedis normal   Arterial Pulses Left:  Posterior tibialis normal, dorsalis pedis normal   Edema Right: None   Edema Left:  None

## 2024-10-09 RX ORDER — PREGABALIN 25 MG/1
25 CAPSULE ORAL 2 TIMES DAILY
Qty: 60 CAPSULE | Refills: 5 | Status: SHIPPED | OUTPATIENT
Start: 2024-10-09 | End: 2025-04-09

## 2024-10-09 NOTE — TELEPHONE ENCOUNTER
----- Message from Heather sent at 10/9/2024  1:56 PM CDT -----  Pt was given RX for gabapentin (NEURONTIN) 100 MG capsule and she can't take it. It knocks her out. She does want something else. Please call today.  Who Called: Shelbi May    Caller is requesting assistance/information from provider's office.        Patient's Preferred Phone Number on File: [phone]   Best Call Back Number, if different:  Additional Information:

## 2024-10-22 DIAGNOSIS — M54.12 CERVICAL RADICULOPATHY: Primary | ICD-10-CM

## 2024-10-25 ENCOUNTER — HOSPITAL ENCOUNTER (OUTPATIENT)
Dept: RADIOLOGY | Facility: HOSPITAL | Age: 79
Discharge: HOME | End: 2024-10-25
Attending: ORTHOPAEDIC SURGERY
Payer: MEDICARE

## 2024-10-25 DIAGNOSIS — M54.16 LUMBAR RADICULOPATHY, CHRONIC: ICD-10-CM

## 2024-10-25 DIAGNOSIS — M54.12 CERVICAL RADICULOPATHY: ICD-10-CM

## 2024-10-25 PROCEDURE — 72148 MRI LUMBAR SPINE W/O DYE: CPT | Mod: 26,,, | Performed by: RADIOLOGY

## 2024-10-25 PROCEDURE — 72148 MRI LUMBAR SPINE W/O DYE: CPT | Mod: TC

## 2024-10-25 PROCEDURE — 72141 MRI NECK SPINE W/O DYE: CPT | Mod: 26,,, | Performed by: RADIOLOGY

## 2024-10-25 PROCEDURE — 72141 MRI NECK SPINE W/O DYE: CPT | Mod: TC

## 2024-10-28 ENCOUNTER — OFFICE VISIT (OUTPATIENT)
Dept: SPINE | Facility: CLINIC | Age: 79
End: 2024-10-28
Payer: MEDICARE

## 2024-10-28 DIAGNOSIS — G95.9 CERVICAL MYELOPATHY: Primary | ICD-10-CM

## 2024-10-28 DIAGNOSIS — M54.16 LUMBAR RADICULOPATHY, CHRONIC: ICD-10-CM

## 2024-10-28 DIAGNOSIS — M54.12 CERVICAL RADICULOPATHY: ICD-10-CM

## 2024-10-28 PROCEDURE — 99999 PR PBB SHADOW E&M-EST. PATIENT-LVL II: CPT | Mod: PBBFAC,,, | Performed by: ORTHOPAEDIC SURGERY

## 2024-10-28 PROCEDURE — 99214 OFFICE O/P EST MOD 30 MIN: CPT | Mod: S$PBB,,, | Performed by: ORTHOPAEDIC SURGERY

## 2024-10-28 PROCEDURE — 99212 OFFICE O/P EST SF 10 MIN: CPT | Mod: PBBFAC | Performed by: ORTHOPAEDIC SURGERY

## 2024-11-11 DIAGNOSIS — G95.9 CERVICAL MYELOPATHY: Primary | ICD-10-CM

## 2024-11-11 RX ORDER — PREGABALIN 50 MG/1
50 CAPSULE ORAL 3 TIMES DAILY
Qty: 90 CAPSULE | Refills: 5 | Status: SHIPPED | OUTPATIENT
Start: 2024-11-11 | End: 2025-05-12

## 2024-11-11 NOTE — TELEPHONE ENCOUNTER
Patient states the lyrica was working great for her but is not working now. Wants to try a stronger dose.       ----- Message from Cristal sent at 11/11/2024 12:25 PM CST -----  Regarding: medication  Who Called: Shelbi May    Caller is requesting assistance/information from provider's office.    Symptoms (please be specific): talk to the nurse about medication not working anymore        Preferred Method of Contact: Phone Call  Patient's Preferred Phone Number on File: [phone]   Best Call Back Number, if different:  Additional Information:

## 2024-12-07 ENCOUNTER — OFFICE VISIT (OUTPATIENT)
Dept: FAMILY MEDICINE | Facility: CLINIC | Age: 79
End: 2024-12-07
Payer: MEDICARE

## 2024-12-07 VITALS
TEMPERATURE: 98 F | OXYGEN SATURATION: 96 % | SYSTOLIC BLOOD PRESSURE: 90 MMHG | HEART RATE: 66 BPM | BODY MASS INDEX: 20.43 KG/M2 | DIASTOLIC BLOOD PRESSURE: 58 MMHG | WEIGHT: 119 LBS

## 2024-12-07 DIAGNOSIS — J32.9 SINUSITIS, UNSPECIFIED CHRONICITY, UNSPECIFIED LOCATION: Primary | ICD-10-CM

## 2024-12-07 DIAGNOSIS — Z20.828 EXPOSURE TO VIRAL DISEASE: ICD-10-CM

## 2024-12-07 LAB
CTP QC/QA: YES
MOLECULAR STREP A: NEGATIVE
POC MOLECULAR INFLUENZA A AGN: NEGATIVE
POC MOLECULAR INFLUENZA B AGN: NEGATIVE
SARS-COV-2 RDRP RESP QL NAA+PROBE: NEGATIVE

## 2024-12-07 PROCEDURE — 87502 INFLUENZA DNA AMP PROBE: CPT | Mod: RHCUB | Performed by: FAMILY MEDICINE

## 2024-12-07 PROCEDURE — 87635 SARS-COV-2 COVID-19 AMP PRB: CPT | Mod: RHCUB | Performed by: FAMILY MEDICINE

## 2024-12-07 PROCEDURE — 99214 OFFICE O/P EST MOD 30 MIN: CPT | Mod: ,,, | Performed by: FAMILY MEDICINE

## 2024-12-07 PROCEDURE — 96372 THER/PROPH/DIAG INJ SC/IM: CPT | Mod: ,,, | Performed by: FAMILY MEDICINE

## 2024-12-07 PROCEDURE — 87651 STREP A DNA AMP PROBE: CPT | Mod: RHCUB | Performed by: FAMILY MEDICINE

## 2024-12-07 RX ORDER — PREDNISONE 20 MG/1
20 TABLET ORAL DAILY
Qty: 5 TABLET | Refills: 0 | Status: SHIPPED | OUTPATIENT
Start: 2024-12-07 | End: 2024-12-12

## 2024-12-07 RX ORDER — DEXAMETHASONE SODIUM PHOSPHATE 4 MG/ML
4 INJECTION, SOLUTION INTRA-ARTICULAR; INTRALESIONAL; INTRAMUSCULAR; INTRAVENOUS; SOFT TISSUE
Status: COMPLETED | OUTPATIENT
Start: 2024-12-07 | End: 2024-12-07

## 2024-12-07 RX ORDER — BENZONATATE 100 MG/1
100 CAPSULE ORAL 3 TIMES DAILY PRN
Qty: 20 CAPSULE | Refills: 0 | Status: SHIPPED | OUTPATIENT
Start: 2024-12-07

## 2024-12-07 RX ORDER — AMOXICILLIN AND CLAVULANATE POTASSIUM 875; 125 MG/1; MG/1
1 TABLET, FILM COATED ORAL 2 TIMES DAILY
Qty: 14 TABLET | Refills: 0 | Status: SHIPPED | OUTPATIENT
Start: 2024-12-07 | End: 2024-12-14

## 2024-12-07 RX ADMIN — DEXAMETHASONE SODIUM PHOSPHATE 4 MG: 4 INJECTION, SOLUTION INTRA-ARTICULAR; INTRALESIONAL; INTRAMUSCULAR; INTRAVENOUS; SOFT TISSUE at 04:12

## 2024-12-07 NOTE — PROGRESS NOTES
Subjective:       Patient ID: Shelbi May is a 79 y.o. female.    Chief Complaint: Cough (Symptoms started tuesday), Sore Throat, and Nasal Congestion    Cough  Associated symptoms include postnasal drip, rhinorrhea and a sore throat. Pertinent negatives include no chest pain, chills, ear pain, fever, headaches, myalgias, rash, shortness of breath or wheezing. There is no history of environmental allergies.   Sore Throat   Associated symptoms include congestion and coughing. Pertinent negatives include no abdominal pain, diarrhea, drooling, ear discharge, ear pain, headaches, neck pain, shortness of breath, stridor, trouble swallowing or vomiting.     Review of Systems   Constitutional:  Negative for activity change, appetite change, chills, diaphoresis, fatigue, fever and unexpected weight change.   HENT:  Positive for nasal congestion, postnasal drip, rhinorrhea, sinus pressure/congestion and sore throat. Negative for dental problem, drooling, ear discharge, ear pain, facial swelling, hearing loss, mouth sores, nosebleeds, sneezing, tinnitus, trouble swallowing, voice change and goiter.    Eyes:  Negative for photophobia, discharge, itching and visual disturbance.   Respiratory:  Positive for cough. Negative for apnea, choking, chest tightness, shortness of breath, wheezing and stridor.    Cardiovascular:  Negative for chest pain, palpitations, leg swelling and claudication.   Gastrointestinal:  Negative for abdominal distention, abdominal pain, anal bleeding, blood in stool, change in bowel habit, constipation, diarrhea, nausea and vomiting.   Endocrine: Negative for cold intolerance, heat intolerance, polydipsia, polyphagia and polyuria.   Genitourinary:  Negative for bladder incontinence, decreased urine volume, difficulty urinating, dysuria, enuresis, flank pain, frequency, hematuria, nocturia, pelvic pain and urgency.   Musculoskeletal:  Negative for arthralgias, back pain, gait problem, joint  swelling, leg pain, myalgias, neck pain, neck stiffness and joint deformity.   Integumentary:  Negative for pallor, rash, wound, breast mass and breast tenderness.   Allergic/Immunologic: Negative for environmental allergies, food allergies and immunocompromised state.   Neurological:  Negative for dizziness, vertigo, tremors, seizures, syncope, facial asymmetry, speech difficulty, weakness, light-headedness, numbness, headaches, memory loss and coordination difficulties.   Hematological:  Negative for adenopathy. Does not bruise/bleed easily.   Psychiatric/Behavioral:  Negative for agitation, behavioral problems, confusion, decreased concentration, dysphoric mood, hallucinations, self-injury, sleep disturbance and suicidal ideas. The patient is not nervous/anxious and is not hyperactive.    Breast: Negative for mass and tenderness        Objective:      Physical Exam  Vitals reviewed.   Constitutional:       Appearance: Normal appearance.   HENT:      Head: Normocephalic and atraumatic.      Right Ear: Tympanic membrane, ear canal and external ear normal.      Left Ear: Tympanic membrane, ear canal and external ear normal.      Nose: Congestion and rhinorrhea present.      Mouth/Throat:      Mouth: Mucous membranes are moist.      Pharynx: Oropharynx is clear. Posterior oropharyngeal erythema present.   Eyes:      Extraocular Movements: Extraocular movements intact.      Conjunctiva/sclera: Conjunctivae normal.      Pupils: Pupils are equal, round, and reactive to light.   Cardiovascular:      Rate and Rhythm: Normal rate and regular rhythm.      Pulses: Normal pulses.      Heart sounds: Normal heart sounds.   Pulmonary:      Effort: Pulmonary effort is normal.      Breath sounds: Rhonchi present.   Abdominal:      General: Bowel sounds are normal.      Palpations: Abdomen is soft.   Musculoskeletal:         General: Normal range of motion.      Cervical back: Normal range of motion and neck supple.   Skin:      General: Skin is warm and dry.   Neurological:      General: No focal deficit present.      Mental Status: She is alert. Mental status is at baseline.   Psychiatric:         Mood and Affect: Mood normal.         Behavior: Behavior normal.         Thought Content: Thought content normal.         Judgment: Judgment normal.         Assessment:       1. Sinusitis, unspecified chronicity, unspecified location    2. Exposure to viral disease        Plan:     Sinusitis, unspecified chronicity, unspecified location  -     dexAMETHasone injection 4 mg  -     amoxicillin-clavulanate 875-125mg (AUGMENTIN) 875-125 mg per tablet; Take 1 tablet by mouth 2 (two) times a day. for 7 days  Dispense: 14 tablet; Refill: 0  -     predniSONE (DELTASONE) 20 MG tablet; Take 1 tablet (20 mg total) by mouth once daily. for 5 days  Dispense: 5 tablet; Refill: 0  -     benzonatate (TESSALON) 100 MG capsule; Take 1 capsule (100 mg total) by mouth 3 (three) times daily as needed for Cough.  Dispense: 20 capsule; Refill: 0    Exposure to viral disease  -     POCT COVID-19 Rapid Screening  -     POCT Influenza A/B Molecular  -     POCT Strep A, Molecular

## 2024-12-16 ENCOUNTER — TELEPHONE (OUTPATIENT)
Dept: NEPHROLOGY | Facility: CLINIC | Age: 79
End: 2024-12-16
Payer: MEDICARE

## 2024-12-16 NOTE — TELEPHONE ENCOUNTER
----- Message from Magaly sent at 12/16/2024  8:37 AM CST -----  Who Called: Shelbi May    Caller is requesting assistance/information from provider's office.    Patient had to reschedule her appt from today to Jan 9th due to not feeling well. She was just wanting to let you know and also if lab work is needed before her appt just let her know. Thanks      Preferred Method of Contact: Phone Call  Patient's Preferred Phone Number on File: [phone]

## 2024-12-18 DIAGNOSIS — I10 PRIMARY HYPERTENSION: Primary | ICD-10-CM

## 2024-12-18 RX ORDER — HYDROCHLOROTHIAZIDE 25 MG/1
25 TABLET ORAL DAILY
Qty: 90 TABLET | Refills: 0 | Status: SHIPPED | OUTPATIENT
Start: 2024-12-18 | End: 2025-03-18

## 2024-12-18 NOTE — TELEPHONE ENCOUNTER
----- Message from Monica sent at 12/18/2024  9:03 AM CST -----  Who Called: Shelbi May    Refill or New Rx:Refill  RX Name and Strength:hydroCHLOROthiazide (HYDRODIURIL) 25 MG tablet - -  - --  Sig - Route: Take 25 mg by mouth. - Oral      How is the patient currently taking it? (ex. 1XDay):1x  Is this a 30 day or 90 day RX:30 day  Local or Mail Order:Local  List of preferred pharmacies on file (remove unneeded): [unfilled]  If different Pharmacy is requested, enter Pharmacy information here including location and phone number: [facility] [address] [place] [id number]  Phone: [phone] Fax: [phone]      Ordering Provider:Jaky Mitchell      Preferred Method of Contact: Phone Call  Patient's Preferred Phone Number on File: [phone]   Best Call Back Number, if different:  Additional Information:

## 2024-12-30 ENCOUNTER — HOSPITAL ENCOUNTER (OUTPATIENT)
Dept: RADIOLOGY | Facility: HOSPITAL | Age: 79
Discharge: HOME | End: 2024-12-30
Attending: ORTHOPAEDIC SURGERY
Payer: MEDICARE

## 2024-12-30 ENCOUNTER — OFFICE VISIT (OUTPATIENT)
Dept: SPINE | Facility: CLINIC | Age: 79
End: 2024-12-30
Payer: MEDICARE

## 2024-12-30 DIAGNOSIS — G95.9 CERVICAL MYELOPATHY: ICD-10-CM

## 2024-12-30 DIAGNOSIS — M54.16 LUMBAR RADICULOPATHY, CHRONIC: Primary | ICD-10-CM

## 2024-12-30 DIAGNOSIS — M54.16 LUMBAR RADICULOPATHY, CHRONIC: ICD-10-CM

## 2024-12-30 DIAGNOSIS — M54.14 THORACIC RADICULOPATHY: Primary | ICD-10-CM

## 2024-12-30 DIAGNOSIS — M54.12 CERVICAL RADICULOPATHY: ICD-10-CM

## 2024-12-30 PROCEDURE — 72050 X-RAY EXAM NECK SPINE 4/5VWS: CPT | Mod: TC

## 2024-12-30 PROCEDURE — 99213 OFFICE O/P EST LOW 20 MIN: CPT | Mod: PBBFAC,25 | Performed by: ORTHOPAEDIC SURGERY

## 2024-12-30 PROCEDURE — 72110 X-RAY EXAM L-2 SPINE 4/>VWS: CPT | Mod: TC

## 2024-12-30 PROCEDURE — 99214 OFFICE O/P EST MOD 30 MIN: CPT | Mod: S$PBB,,, | Performed by: ORTHOPAEDIC SURGERY

## 2024-12-30 PROCEDURE — 99999 PR PBB SHADOW E&M-EST. PATIENT-LVL III: CPT | Mod: PBBFAC,,, | Performed by: ORTHOPAEDIC SURGERY

## 2024-12-30 RX ORDER — MECLIZINE HYDROCHLORIDE 25 MG/1
25 TABLET ORAL 3 TIMES DAILY PRN
Qty: 30 TABLET | Refills: 5 | Status: SHIPPED | OUTPATIENT
Start: 2024-12-30

## 2024-12-30 RX ORDER — PREGABALIN 100 MG/1
100 CAPSULE ORAL 2 TIMES DAILY
Qty: 60 CAPSULE | Refills: 5 | Status: SHIPPED | OUTPATIENT
Start: 2024-12-30 | End: 2025-06-30

## 2024-12-30 NOTE — PATIENT INSTRUCTIONS
Your MRI is scheduled for 01/10 @3786 at [facility]    Follow up with Dr. Woods the same day directly after the MRI.     Referral for EMG nerve conduction study.    Start Lyrica 100mg and Antivert.

## 2024-12-30 NOTE — PROGRESS NOTES
AP, lateral, flexion/extension views of the cervical spine reviewed    On the AP there is normal coronal alignment.  There is uncovertebral and facet hypertrophy seen.  On the lateral there is loss of cervical lordosis.  There are spondylotic changes noted with decrease in disc height and osteophyte formation seen.  Grade 1 anterolisthesis at C4-5.     Impression:  Degenerative changes of cervical spine as noted above

## 2024-12-30 NOTE — PROGRESS NOTES
MDM/time:  30-35 minutes spent on this encounter including 10 minutes reviewing imaging and notes, 15 minutes with the patient, 5 minutes documentation    ASSESSMENT:  79 y.o. female with cervical spondylosis and myeloradiculopathy and lumbar spondylosis with radiculopathy     PLAN:  I have explained the natural history of myelopathy to her.  She wants to continue to observe.  MRI thoracic spine.  EMG bilateral lower extremities.  Increase Lyrica to 100 mg 3 times a day.  Antivert    HPI:  79 y.o. female here for repeat evaluation of neck pain and lower back pain that radiates into bilateral legs.  She has had a couple falls lately.  She has increased her Lyrica which seems to help with the legs.  Patient reports a history of neck pain for the past few years has been seen in [place] by Dr. King for her cervical spine issues was prescribed physical therapy which did help her pain in the past she reports over the past 2 years her pain has gotten worse finds it difficult to do any type of bilateral arm movement due to neck pain and shortness of breath.  She also complains of lower back pain for the past year which worsens when she goes from a sitting to standing position has difficulty standing up straight and this pain is accompanied by shortness of breath as well.  Patient has had a workup for pulmonary and cardiac issues which all have resulted in no active disease.  She has also had a bone density 02/24/2024 that did show osteoporosis unfortunately she has not been treated for osteoporosis.  Patient denies difficulty with  strength.  Balance issues due to vertigo with no recent falls.  Denies bladder bowel incontinence.  Decreased walking tolerance due to pain in her back and shortness of breath.  Currently takes tramadol and Tylenol as needed for pain.  She has not had any recent physical therapy.  No prior pain management.  No prior spine surgery.  Patient is not a smoker.      IMAGING:  X-rays lumbar  spine reviewed show:  On the AP there is normal coronal alignment.  There are 5 non-rib-bearing lumbar vertebrae.  On the lateral there is decreased lumbar lordosis.  There is spondylotic disease with decreased disc height and osteophyte formation noted.  No fractures or listhesis noted.  No instability on flexion-extension views.     MRI lumbar spine 10/25/2024 reviewed shows:   At L3-4 there is moderate right foraminal stenosis     MRI cervical spine 10/25/2024 reviewed shows:   At C3-4 there is grade 1 spondylolisthesis.  Moderate left foraminal stenosis   At C4-5 there is moderate central stenosis.  Severe left foraminal stenosis   At C5-6 there is moderate central stenosis.  Severe bilateral foraminal stenosis   At C6-7 there is moderate left-greater-than-right foraminal stenosis    Past Medical History:   Diagnosis Date    Age-related osteoporosis without current pathological fracture 02/26/2024    Allergy     Cancer     colon cancer no treatment     Depression     Diverticula, colon 05/17/2021    History of colon cancer, stage I 05/17/2021    Hyperlipidemia     Stroke      Past Surgical History:   Procedure Laterality Date    TONSILLECTOMY       Social History     Tobacco Use    Smoking status: Never     Passive exposure: Past    Smokeless tobacco: Never   Substance Use Topics    Alcohol use: Never    Drug use: Never      Current Outpatient Medications   Medication Instructions    acetaminophen (TYLENOL) 500 mg, Every 6 hours PRN    aspirin (ECOTRIN) 81 mg, Daily    benzonatate (TESSALON) 100 mg, Oral, 3 times daily PRN    fluticasone propionate (FLONASE) 100 mcg, Each Nostril, Daily    hydroCHLOROthiazide (HYDRODIURIL) 25 mg, Oral, Daily    ketorolac 0.5% (ACULAR) 0.5 % Drop 1 drop    loratadine (CLARITIN) 10 mg, Daily    meclizine (ANTIVERT) 25 mg, Oral, 3 times daily PRN    meclizine (ANTIVERT) 25 mg, Oral, 3 times daily PRN    moxifloxacin (VIGAMOX) 0.5 % ophthalmic solution 1 drop    pregabalin (LYRICA)  50 mg, Oral, 3 times daily    pregabalin (LYRICA) 100 mg, Oral, 2 times daily        EXAM:  Constitutional  General Appearance:  There is no height or weight on file to calculate BMI., NAD  Psychiatric   Orientation: Oriented to time, oriented to place, oriented to person  Mood and Affect: Active and alert, normal mood, normal affect  Gait and Station   Appearance:  Normal gait, normal tandem gait, able to walk on toes, able to walk on heels    CERVICAL  Musculoskeletal System  Shoulder:  normal appearance, no instability, no tenderness, normal ROM right, normal ROM left, no pain with ROM    Cervical Spine  Inspection:  alignment normal, no muscle atrophy  Soft Tissue Palpation on the Right:  no tenderness of the paracervicals, no tenderness of the trapezius, no tenderness of the rhomboid  Soft Tissue Palpation on the Left:  no tenderness of the paracervicals, no tenderness of the trapezius, no tenderness of the rhomboid  Bony Palpation:  no tenderness of the occipital protuberance  Active Range:     Flexion normal, Extension normal, Rotation to the left normal, Rotation to the right decreased, Lateral flexion to the left decreased, Lateral flexion to the right normal   Pain elicited on motion    Motor Strength  C5 on the right:  abduction deltoid 5/5  C5 on the left:  abduction deltoid 5/5  C6 on the Right:  flexion biceps 5/5, wrist extension 5/5  C6 on the Left:  flexion biceps 5/5, wrist extension 5/5  C7 on the Right:  extension fingers 5/5, extension triceps 5/5  C7 on the Left:  extension fingers 5/5, extension triceps 5/5  C8 on the Right:  flexion fingers 5/5  C8 on the Left:  flexion fingers 5/5  T1 on the Right:  abduction fingers 5/5  T1 on the Left:  abduction fingers 5/5    Neurological System  Sensation on the Right:  normal sensation of the extremities: right, normal median nerve distribution, normal ulnar nerve distribution  Sensation on the Left:  normal sensation of the extremities: left, normal  median nerve distribution, normal ulnar nerve distribution  Biceps Reflex Right:  Hyperreflexia, Brachioradialis Reflex Right:  Hyperreflexia, Triceps Reflex Right:  Hyperreflexia  Biceps Reflex Left:  Hyperreflexia, Brachioradialis Reflex Left:  Hyperreflexia, Triceps Reflex Left:  Hyperreflexia  Special Test on the Right:  Spurlings test negative, Hoffmans reflex absent, no ankle clonus, Durkan test negative, Tinels sign negative at the elbow  Special Test on the Left:  Spurlings test negative, Hoffmans reflex absent, no ankle clonus, Durkan test negative, Tinels sign negative at the elbow    Skin:   Head and Neck:  normal   Right Upper Extremity:  normal   Left Upper Extremity:  normal    Cardiovascular:   Arterial Pulses Right:  radial right   Arterial Pulses Left:  radial left   Edema Right:  none   Edema Left:  none    LUMBAR  Musculoskeletal System   Hips: Normal appearance, no leg length discrepancy, normal motion; left, normal motion; right    Lumbar Spine                   Inspection:  Normal alignment, normal sagittal balance                  Range of motion:  Decreased flexion, extension, lateral bending, rotation. Pain with range of motion                  Bony Palpation of the Lumbar Spine:  No tenderness of the spinous process, no tenderness of the sacrum, no tenderness of the coccyx                  Bony Palpation of the Right Hip:  No tenderness of the iliac crest, no tenderness of the sciatic notch, no tenderness of the SI joint                  Bony Palpation of the Left Hip:  No tenderness of the iliac crest, no tenderness of the sciatic notch, no tenderness of the SI joint                  Soft Tissue Palpation on the Right:  No tenderness of the paraspinal region, no tenderness of the iliolumbar region                  Soft Tissue Palpation on the Left:  No tenderness of the paraspinal region, no tenderness of the iliolumbar region    Motor Strength   L1 Right:  Hip flexion iliopsoas 5/5    L1  Left:  Hip flexion iliopsoas 5/5              L2-L4 Right:  Knee extension quadriceps 5/5, tibialis anterior 5/5              L2-L4 Left:  Knee extension quadriceps 5/5, tibialis anterior 5/5   L5 Right:  Extensor hallucis llongus 5/5,    L5 Left:  Extensor hallucis longus 5/5,    S1 Right:  Plantar flexion gastrocnemius 5/5   S1 Left:  Plantar flexion gastrocnemius 5/5    Neurological System   Ankle Reflex Right:  normal   Ankle Reflex Left: normal   Knee Reflex Right:  Hyperreflexia   Knee Reflex Left:  Hyperreflexia   Sensation on the Right:  L2 normal, L3 normal, L4 normal, L5 normal, S1 normal   Sensation on the Left:  L2 normal, L3 normal, L4 normal, L5 normal, S1 normal              Special Test on the Right:  Seated straight leg raising test negative, no clonus of the ankle              Special Test on the Left:  Seated straight leg raising test negative, no clonus of the ankle    Skin   Lumbosacral Spine:  Normal skin    Cardiovascular System   Arterial Pulses Right:  Posterior tibialis normal, dorsalis pedis normal   Arterial Pulses Left:  Posterior tibialis normal, dorsalis pedis normal   Edema Right: None   Edema Left:  None

## 2025-01-09 RX ORDER — TRAMADOL HYDROCHLORIDE 50 MG/1
TABLET ORAL
COMMUNITY

## 2025-01-09 RX ORDER — CIPROFLOXACIN 500 MG/1
1 TABLET ORAL 2 TIMES DAILY
COMMUNITY

## 2025-01-09 RX ORDER — FLUCONAZOLE 150 MG/1
1 TABLET ORAL DAILY
COMMUNITY

## 2025-01-09 RX ORDER — BUPROPION HYDROCHLORIDE 300 MG/1
1 TABLET ORAL DAILY
COMMUNITY

## 2025-01-09 RX ORDER — PROMETHAZINE HYDROCHLORIDE 25 MG/1
TABLET ORAL
COMMUNITY

## 2025-01-09 RX ORDER — PREGABALIN 25 MG/1
1 CAPSULE ORAL 2 TIMES DAILY
COMMUNITY

## 2025-01-09 RX ORDER — GABAPENTIN 100 MG/1
1 CAPSULE ORAL 3 TIMES DAILY
COMMUNITY

## 2025-01-09 RX ORDER — PRAVASTATIN SODIUM 40 MG/1
1 TABLET ORAL DAILY
COMMUNITY

## 2025-01-09 RX ORDER — SERTRALINE HYDROCHLORIDE 25 MG/1
2 TABLET, FILM COATED ORAL DAILY
COMMUNITY

## 2025-01-09 RX ORDER — AZITHROMYCIN 250 MG/1
TABLET, FILM COATED ORAL
COMMUNITY
Start: 2024-12-20

## 2025-01-09 RX ORDER — PREDNISOLONE ACETATE 10 MG/ML
SUSPENSION/ DROPS OPHTHALMIC
COMMUNITY

## 2025-01-09 RX ORDER — ESCITALOPRAM OXALATE 5 MG/1
1 TABLET ORAL DAILY
COMMUNITY

## 2025-01-09 RX ORDER — PSEUDOEPHEDRINE HYDROCHLORIDE 60 MG/1
TABLET ORAL
COMMUNITY

## 2025-01-09 RX ORDER — PREDNISONE 20 MG/1
TABLET ORAL
COMMUNITY
Start: 2024-12-20

## 2025-01-09 RX ORDER — BROMPHENIRAMINE MALEATE, PSEUDOEPHEDRINE HYDROCHLORIDE AND DEXTROMETHORPHAN HYDROBROMIDE 2; 10; 30 MG/5ML; MG/5ML; MG/5ML
SYRUP ORAL
COMMUNITY
Start: 2024-12-20

## 2025-01-10 ENCOUNTER — OFFICE VISIT (OUTPATIENT)
Dept: NEPHROLOGY | Facility: CLINIC | Age: 80
End: 2025-01-10
Payer: MEDICARE

## 2025-01-10 VITALS
HEART RATE: 61 BPM | DIASTOLIC BLOOD PRESSURE: 70 MMHG | OXYGEN SATURATION: 98 % | SYSTOLIC BLOOD PRESSURE: 110 MMHG | RESPIRATION RATE: 18 BRPM | BODY MASS INDEX: 21.89 KG/M2 | WEIGHT: 128.19 LBS | HEIGHT: 64 IN

## 2025-01-10 DIAGNOSIS — M47.812 SPONDYLOSIS OF CERVICAL SPINE: ICD-10-CM

## 2025-01-10 DIAGNOSIS — N18.32 CKD STAGE 3B, GFR 30-44 ML/MIN: Primary | ICD-10-CM

## 2025-01-10 DIAGNOSIS — M47.10 SPINAL CORD COMPRESSION DUE TO DEGENERATIVE DISORDER OF SPINAL COLUMN: ICD-10-CM

## 2025-01-10 PROCEDURE — 99999 PR PBB SHADOW E&M-EST. PATIENT-LVL V: CPT | Mod: PBBFAC,,, | Performed by: NURSE PRACTITIONER

## 2025-01-10 PROCEDURE — 99214 OFFICE O/P EST MOD 30 MIN: CPT | Mod: S$PBB,,, | Performed by: NURSE PRACTITIONER

## 2025-01-10 PROCEDURE — 99215 OFFICE O/P EST HI 40 MIN: CPT | Mod: PBBFAC | Performed by: NURSE PRACTITIONER

## 2025-01-10 NOTE — PROGRESS NOTES
[facility] History and Physical  Patient Name: Shelbi May  MRN: [id number]  Age: 79 y.o.  : 1945    Date: 1/10/2025 10:31 AM    Chief Complaint: Follow Up    HPI :   Ms aMy presents to Nephrology clinic for follow up. She is followed by Dr. Guzman DO as her primary care provider. She is referred by her Pulmonologist Dr. Barnes.     Hx of CVA (about 10 years): on ASA, statin.     Anxiety: followed by PCP. On wellbutrin.     Nephrology history: No FH of kidney disease, no nephrolithiasis, or recurrent UTIs. Recently using OTC medications including NSAIDs for back pain. Aleve 200 mg daily for about one year. She used to take a lot of NSAIDs related to her cycle. Now using tylenol.     Hx of malignant cancerous polyp>: s/p GI removal.     Patient denies any CP, SOB, peripheral edema, dysuria, hematuria, changes in urinary habits, or increased frequency of urination.     Past Medical History:  has a past medical history of Age-related osteoporosis without current pathological fracture (2024), Allergy, Cancer, Depression, Diverticula, colon (2021), History of colon cancer, stage I (2021), Hyperlipidemia, and Stroke.     Past Surgical History:   has a past surgical history that includes Tonsillectomy.     Family History:  family history includes Cancer in her brother; Heart disease in her father; Hypertension in her mother. No family history of kidney disease.     Social History:   reports that she has never smoked. She has been exposed to tobacco smoke. She has never used smokeless tobacco. She reports that she does not drink alcohol and does not use drugs.     Allergies: has No Known Allergies.     Medications: Reviewed including OTC medications, herbal supplements, and NSAIDS.     Old records have been reviewed.      Review of Systems:  ROS: A 10 point ROS was completed and found to be negative except for that mentioned above.           Physical Exam:  Vitals:    01/10/25 0937   BP: 110/70   Pulse: 61   Resp: 18       Constitutional: sitting in chair, in NAD  Eyes: EOMI, white sclera  ENMT: moist mucus membranes, nares patent  Cardiovascular: normal rate, S1/S2 noted, no edema  Respiratory: symmetrical chest expansion, CTA-B  Gastrointestinal: +BS, soft, NT/ND  Musculoskeletal: normal, no joint erythema/effusions  Skin: no rash, no purpura, warm extremities  Neurological: Alert and Oriented x 3, afocal    Labs:   Lab Results   Component Value Date     08/08/2024    K 4.1 08/08/2024    CREATININE 2.00 (H) 08/08/2024    ALT 15 03/08/2023    AST 18 03/08/2023    HGBA1C 5.2 04/26/2024    TSH 1.950 02/26/2024    WBC 5.35 08/08/2024    HGB 13.2 08/08/2024    HCT 40.0 08/08/2024     (L) 08/08/2024        Otherwise Reviewed    Assessment/Plan:     CKD stage IIIb likely related to aging and history of multiple years of NSAID use. Baseline sCr TBD typically fluctuates based on volume status, sCr today pending. Likely getting dehydrated often. Counseled to avoid nephrotoxic agents such as NSAIDs. Encouraged to increase hydration with water.     Proteinuria: Protein/creatinine ratio pending    Anemia: CBC pending    BMD: Calcium and Phosphorus PTH, Vit D pending    Patient states that she is having random times where she completely loses all feeling in her lower extremities causing her to fall. States that this is an ongoing issue. Previously saw Dr. Keita at [facility] over 10 years ago but is requesting referral today for second opinion. Has had abnormal Cervical MRI.     RTC 3-4 months with CBC, RFP, UA, urine for Prot:creat ratio, PTH, Vit D    Signature:             ADRIENNE Santos  [facility]  [address]  [place] [address]  [phone]        30 minutes of total time spent on the encounter, which includes face to face time and non-face to face time preparing to  see the patient (eg, review of tests), Obtaining and/or reviewing separately obtained history, Documenting clinical information in the electronic or other health record, Independently interpreting results (not separately reported) and communicating results to the patient/family/caregiver, or Care coordination (not separately reported).       Date of encounter: 1/10/25

## 2025-02-12 ENCOUNTER — OFFICE VISIT (OUTPATIENT)
Dept: INTERNAL MEDICINE | Facility: CLINIC | Age: 80
End: 2025-02-12
Payer: MEDICARE

## 2025-02-12 VITALS
OXYGEN SATURATION: 99 % | BODY MASS INDEX: 20.83 KG/M2 | DIASTOLIC BLOOD PRESSURE: 64 MMHG | RESPIRATION RATE: 18 BRPM | WEIGHT: 122 LBS | HEIGHT: 64 IN | HEART RATE: 62 BPM | SYSTOLIC BLOOD PRESSURE: 108 MMHG

## 2025-02-12 DIAGNOSIS — M81.0 AGE-RELATED OSTEOPOROSIS WITHOUT CURRENT PATHOLOGICAL FRACTURE: Primary | ICD-10-CM

## 2025-02-12 DIAGNOSIS — Z91.81 RISK FOR FALLS: ICD-10-CM

## 2025-02-12 PROCEDURE — 99215 OFFICE O/P EST HI 40 MIN: CPT | Mod: PBBFAC | Performed by: NURSE PRACTITIONER

## 2025-02-12 PROCEDURE — 99999 PR PBB SHADOW E&M-EST. PATIENT-LVL V: CPT | Mod: PBBFAC,,, | Performed by: NURSE PRACTITIONER

## 2025-02-12 PROCEDURE — 99215 OFFICE O/P EST HI 40 MIN: CPT | Mod: S$PBB,,, | Performed by: NURSE PRACTITIONER

## 2025-02-12 NOTE — PROGRESS NOTES
"HPI/CC  Shelbi May is a 80 year old  female referred for evaluation and management of osteoporosis by Dr. Bradley Woods.  PCP is doctor at [facility].  She reports recent "spells of falling;"  states that she has spoken with Dr. Woods and another neurospine specialist about episodes  Medical/surgical history: History of  CKD Stage 3B, "small TIA"- states statin and ASA are preventive measures for stroke.  States diuretic is not prescribed for hypertension, rather it is for fluid.  History of colon cancer.  History of cervical, thoracic, and lumbar pain.  Denies any diagnosed compression fractures.    DXA Scan  I reviewed images and report of DXA scan performed at [facility] on 5/3/2024   T-Scores  Femoral Neck-3.0, Total Hip -2.0, and Total Spine -3.3  In range of osteoporosis    Calcium and Vit D  Not supplementing calcium and Vit D.  Recommended addition of Citracal Petites with D one daily    Weight Bearing Exercise/ Mobility  Uses rollator or wheelchair "as needed".  Other than recent falls, does not have limitations on mobility    Falls  Recent "spells of falling."  States she felt legs become weak, then has no memory of falling.  States she noticed gasping during episode; states was referred to pulmonologist Dr. Barnes for this symptom.  States the episodes have stopped since she started Lyrica.    Dental Status  Good oral hygiene    Risk factors for osteoporosis/fracture  Positive for  Postmenopausal  female with menopause age  Mother had osteoporosis and hip fracture in her 90s  Small frame  Recent frequent falls  Negative for   No fractures in adulthood  Does not smoke cigarettes  Does not drink alcohol in excessive amounts  No history of long term use of steroids  Review of Systems   Constitutional: Negative for fever, chills, malaise.    Respiratory: Negative for cough and shortness of breath.    Cardiovascular: Negative for chest pain and palpitations.   Gastrointestinal: " Negative for change in bowel habit, constipation   Neurological: Negative for dizziness, syncope, weakness and light-headedness.   Musculoskeletal:    Does not require assistive devices for ambulation.  Occasional use of rollator and wheelchair when she perceives fall risk    Physical Exam  Constitutional:       Appearance: In no distress   HENT:      Mouth/Throat:      Mouth: Mucous membranes are moist.      Dental:  Eyes:      Conjunctiva/sclera: Conjunctivae normal.   Cardiovascular:      Rate and Rhythm: Normal rate and regular rhythm.   Pulmonary:      Effort: Pulmonary effort is normal.   Musculoskeletal:      Cervical back: Normal range of motion.      Thoracic back:  No kyphosis     Lumbar back: Normal range of motion. No scoliosis.      Gait: Not using assistive devices today.  Gait normal  Skin:     General: Skin is warm and dry.   Neurological:      Mental Status: She is alert and oriented to person, place, and time.     Assessment:       1. Postmenopausal osteoporosis   2.      Risk for falls  Plan:   I reviewed the images and report of DXA scan with pt.   Reviewed personal risk factors for osteoporosis  Offered overview of osteoporosis disease process and reviewed role of calcium, Vit D, and weight bearing exercise in bone health.  Advised to add one Citracal Petites with D daily  Reviewed body mechanics and falls precautions.  Encouraged to speak with Primary Care Provider and spine specialist(s) about episodes of falling.  Reviewed role of anabolic and antiresorptive medications, including literature on risks and benefits of treatment vs non treatment with medications.  Offered education on Prolia.Reviewed expected benefits and possible serious side effects of Prolia.  Possible serious side effects include low calcium, severe jaw bone problems, unusual thigh bone fractures, serious infections, and skin problems.  Printed literature provided. Informed that calcium will be checked within 2 weeks of each  Prolia injection.  Lab: Calcium, Vit D  6.   Return 3 weeks  7.   GERMANIA Campos

## 2025-02-26 ENCOUNTER — CLINICAL SUPPORT (OUTPATIENT)
Dept: INTERNAL MEDICINE | Facility: CLINIC | Age: 80
End: 2025-02-26
Payer: MEDICARE

## 2025-02-26 DIAGNOSIS — M81.0 AGE-RELATED OSTEOPOROSIS WITHOUT CURRENT PATHOLOGICAL FRACTURE: Primary | ICD-10-CM

## 2025-02-26 PROCEDURE — 96372 THER/PROPH/DIAG INJ SC/IM: CPT | Mod: PBBFAC | Performed by: NURSE PRACTITIONER

## 2025-02-26 PROCEDURE — 99999PBSHW PR PBB SHADOW TECHNICAL ONLY FILED TO HB: Mod: JZ,PBBFAC,,

## 2025-02-26 PROCEDURE — 99212 OFFICE O/P EST SF 10 MIN: CPT | Mod: PBBFAC

## 2025-02-26 PROCEDURE — 99999 PR PBB SHADOW E&M-EST. PATIENT-LVL II: CPT | Mod: PBBFAC,,,

## 2025-02-26 RX ADMIN — DENOSUMAB 60 MG: 60 INJECTION SUBCUTANEOUS at 10:02

## 2025-03-01 ENCOUNTER — HOSPITAL ENCOUNTER (EMERGENCY)
Facility: HOSPITAL | Age: 80
Discharge: HOME | End: 2025-03-01
Attending: EMERGENCY MEDICINE
Payer: MEDICARE

## 2025-03-01 VITALS
WEIGHT: 123 LBS | BODY MASS INDEX: 21 KG/M2 | HEIGHT: 64 IN | HEART RATE: 67 BPM | RESPIRATION RATE: 18 BRPM | SYSTOLIC BLOOD PRESSURE: 129 MMHG | OXYGEN SATURATION: 97 % | DIASTOLIC BLOOD PRESSURE: 68 MMHG | TEMPERATURE: 98 F

## 2025-03-01 DIAGNOSIS — E87.6 HYPOKALEMIA: Primary | ICD-10-CM

## 2025-03-01 DIAGNOSIS — R55 NEAR SYNCOPE: ICD-10-CM

## 2025-03-01 LAB
ALBUMIN SERPL BCP-MCNC: 3.8 G/DL (ref 3.4–4.8)
ALBUMIN/GLOB SERPL: 1.4 {RATIO}
ALP SERPL-CCNC: 42 U/L (ref 40–150)
ALT SERPL W P-5'-P-CCNC: 10 U/L
ANION GAP SERPL CALCULATED.3IONS-SCNC: 11 MMOL/L (ref 7–16)
AST SERPL W P-5'-P-CCNC: 25 U/L (ref 5–34)
BACTERIA #/AREA URNS HPF: ABNORMAL /HPF
BASOPHILS # BLD AUTO: 0.03 K/UL (ref 0–0.2)
BASOPHILS NFR BLD AUTO: 1 % (ref 0–1)
BILIRUB SERPL-MCNC: 0.9 MG/DL
BILIRUB UR QL STRIP: NEGATIVE
BUN SERPL-MCNC: 22 MG/DL (ref 10–20)
BUN/CREAT SERPL: 21 (ref 6–20)
CALCIUM SERPL-MCNC: 9 MG/DL (ref 8.4–10.2)
CHLORIDE SERPL-SCNC: 104 MMOL/L (ref 98–107)
CLARITY UR: CLEAR
CO2 SERPL-SCNC: 31 MMOL/L (ref 23–31)
COLOR UR: ABNORMAL
CREAT SERPL-MCNC: 1.07 MG/DL (ref 0.55–1.02)
DIFFERENTIAL METHOD BLD: ABNORMAL
EGFR (NO RACE VARIABLE) (RUSH/TITUS): 53 ML/MIN/1.73M2
EOSINOPHIL # BLD AUTO: 0.09 K/UL (ref 0–0.5)
EOSINOPHIL NFR BLD AUTO: 2.9 % (ref 1–4)
ERYTHROCYTE [DISTWIDTH] IN BLOOD BY AUTOMATED COUNT: 12.6 % (ref 11.5–14.5)
GLOBULIN SER-MCNC: 2.8 G/DL (ref 2–4)
GLUCOSE SERPL-MCNC: 103 MG/DL (ref 70–105)
GLUCOSE SERPL-MCNC: 82 MG/DL (ref 82–115)
GLUCOSE UR STRIP-MCNC: NORMAL MG/DL
HCT VFR BLD AUTO: 40.3 % (ref 38–47)
HGB BLD-MCNC: 13.1 G/DL (ref 12–16)
HYALINE CASTS #/AREA URNS LPF: ABNORMAL /LPF
IMM GRANULOCYTES # BLD AUTO: 0 K/UL (ref 0–0.04)
IMM GRANULOCYTES NFR BLD: 0 % (ref 0–0.4)
KETONES UR STRIP-SCNC: NEGATIVE MG/DL
LEUKOCYTE ESTERASE UR QL STRIP: ABNORMAL
LYMPHOCYTES # BLD AUTO: 1.29 K/UL (ref 1–4.8)
LYMPHOCYTES NFR BLD AUTO: 41.7 % (ref 27–41)
MAGNESIUM SERPL-MCNC: 1.7 MG/DL (ref 1.6–2.6)
MCH RBC QN AUTO: 29.4 PG (ref 27–31)
MCHC RBC AUTO-ENTMCNC: 32.5 G/DL (ref 32–36)
MCV RBC AUTO: 90.6 FL (ref 80–96)
MONOCYTES # BLD AUTO: 0.32 K/UL (ref 0–0.8)
MONOCYTES NFR BLD AUTO: 10.4 % (ref 2–6)
MPC BLD CALC-MCNC: 11.7 FL (ref 9.4–12.4)
MUCOUS, UA: ABNORMAL /LPF
NEUTROPHILS # BLD AUTO: 1.36 K/UL (ref 1.8–7.7)
NEUTROPHILS NFR BLD AUTO: 44 % (ref 53–65)
NITRITE UR QL STRIP: NEGATIVE
NRBC # BLD AUTO: 0 X10E3/UL
NRBC, AUTO (.00): 0 %
PH UR STRIP: 6.5 PH UNITS
PLATELET # BLD AUTO: 120 K/UL (ref 150–400)
POTASSIUM SERPL-SCNC: 3.3 MMOL/L (ref 3.5–5.1)
PROT SERPL-MCNC: 6.6 G/DL (ref 5.8–7.6)
PROT UR QL STRIP: NEGATIVE
RBC # BLD AUTO: 4.45 M/UL (ref 4.2–5.4)
RBC # UR STRIP: NEGATIVE /UL
RBC #/AREA URNS HPF: <1 /HPF
SODIUM SERPL-SCNC: 143 MMOL/L (ref 136–145)
SP GR UR STRIP: 1.01
SQUAMOUS #/AREA URNS LPF: ABNORMAL /HPF
UROBILINOGEN UR STRIP-ACNC: NORMAL MG/DL
WBC # BLD AUTO: 3.09 K/UL (ref 4.5–11)
WBC #/AREA URNS HPF: 1 /HPF

## 2025-03-01 PROCEDURE — 82962 GLUCOSE BLOOD TEST: CPT

## 2025-03-01 PROCEDURE — 80053 COMPREHEN METABOLIC PANEL: CPT | Performed by: EMERGENCY MEDICINE

## 2025-03-01 PROCEDURE — 99285 EMERGENCY DEPT VISIT HI MDM: CPT | Mod: 25

## 2025-03-01 PROCEDURE — 93005 ELECTROCARDIOGRAM TRACING: CPT

## 2025-03-01 PROCEDURE — 25000003 PHARM REV CODE 250: Performed by: EMERGENCY MEDICINE

## 2025-03-01 PROCEDURE — 93010 ELECTROCARDIOGRAM REPORT: CPT | Mod: ,,, | Performed by: INTERNAL MEDICINE

## 2025-03-01 PROCEDURE — 81003 URINALYSIS AUTO W/O SCOPE: CPT | Performed by: EMERGENCY MEDICINE

## 2025-03-01 PROCEDURE — 83735 ASSAY OF MAGNESIUM: CPT | Performed by: EMERGENCY MEDICINE

## 2025-03-01 PROCEDURE — 85025 COMPLETE CBC W/AUTO DIFF WBC: CPT | Performed by: EMERGENCY MEDICINE

## 2025-03-01 PROCEDURE — 96360 HYDRATION IV INFUSION INIT: CPT

## 2025-03-01 RX ORDER — POTASSIUM CHLORIDE 20 MEQ/1
40 TABLET, EXTENDED RELEASE ORAL
Status: COMPLETED | OUTPATIENT
Start: 2025-03-01 | End: 2025-03-01

## 2025-03-01 RX ADMIN — POTASSIUM CHLORIDE 40 MEQ: 1500 TABLET, EXTENDED RELEASE ORAL at 04:03

## 2025-03-01 RX ADMIN — SODIUM CHLORIDE 1000 ML: 9 INJECTION, SOLUTION INTRAVENOUS at 02:03

## 2025-03-01 NOTE — DISCHARGE INSTRUCTIONS
Stop taking hydrochlorothiazide.  Return to emergency department for any worsening or further problems.  Follow up in clinic with primary care provider.  Follow up in clinic with Neurology.

## 2025-03-01 NOTE — ED TRIAGE NOTES
"Patient presents to ED with c/o tingling to left arm and left leg.  States that she woke up this morning around 10 am and had an episode where her left arm and left leg were having "jerking like" movement.  States the episode lasted about 3 minutes and subsided when she sat down. States it happened again around 11 and has not happened since.  Upon arrival to ED, patient has no deficits noted.  States she is only feeling tingling at this time.     "

## 2025-03-01 NOTE — ED PROVIDER NOTES
Encounter Date: 3/1/2025    SCRIBE #1 NOTE: I, Natacha Deanna, am scribing for, and in the presence of,  Parker Puckett MD.       History     Chief Complaint   Patient presents with    Tingling     This 80 y.o. female pt presents to the ED with c/o weakness and tingling. The pt reports this morning she experienced a sudden onset of weakness on her left side (arms and legs). Pt said she sat down and asked her spouse to come and help her and her weakness occurred again, and she felt a wave go over her that she has never felt before that lasted for a few minutes. Pt said she takes medication of Lyrica. Pt has Mhx of a TIA and Hx of falling a has seen Dr. Woods, and a Neurologist (spine doctor) to check why she was falling so much. Pt has normal NIH to note.     The history is provided by the patient. No  was used.     Review of patient's allergies indicates:  No Known Allergies  Past Medical History:   Diagnosis Date    Age-related osteoporosis without current pathological fracture 02/26/2024    Allergy     Cancer     colon cancer no treatment     Depression     Diverticula, colon 05/17/2021    History of colon cancer, stage I 05/17/2021    Hyperlipidemia     Stroke      Past Surgical History:   Procedure Laterality Date    TONSILLECTOMY       Family History   Problem Relation Name Age of Onset    Hypertension Mother      Heart disease Father      Cancer Brother       Social History[1]  Review of Systems   Constitutional:  Negative for chills, fatigue and fever.   HENT:  Negative for dental problem.    Eyes:  Negative for pain.   Respiratory:  Negative for shortness of breath.    Cardiovascular:  Negative for chest pain.   Gastrointestinal:  Negative for abdominal pain, nausea and vomiting.   Endocrine: Negative for polyuria.   Genitourinary:  Negative for difficulty urinating.   Allergic/Immunologic: Negative.    Neurological:  Positive for weakness.   Psychiatric/Behavioral:  Negative for behavioral  problems and confusion.    All other systems reviewed and are negative.      Physical Exam     Initial Vitals [03/01/25 1256]   BP Pulse Resp Temp SpO2   (!) 101/55 67 18 98.1 °F (36.7 °C) 97 %      MAP       --         Physical Exam    Nursing note and vitals reviewed.  HENT:   Head: Normocephalic and atraumatic. Mouth/Throat: Oropharynx is clear and moist.   Eyes: Pupils are equal, round, and reactive to light.   Neck: Neck supple.   Normal range of motion.  Cardiovascular:  Normal rate and regular rhythm.           Pulmonary/Chest: Effort normal and breath sounds normal.   Abdominal: Abdomen is soft. She exhibits no distension.   Musculoskeletal:         General: Normal range of motion.      Cervical back: Normal range of motion and neck supple.     Neurological: She is alert.   Skin: Skin is warm. Capillary refill takes less than 2 seconds.   Psychiatric: She has a normal mood and affect.         ED Course   Procedures  Labs Reviewed   COMPREHENSIVE METABOLIC PANEL - Abnormal       Result Value    Sodium 143      Potassium 3.3 (*)     Chloride 104      CO2 31      Anion Gap 11      Glucose 82      BUN 22 (*)     Creatinine 1.07 (*)     BUN/Creatinine Ratio 21 (*)     Calcium 9.0      Total Protein 6.6      Albumin 3.8      Globulin 2.8      A/G Ratio 1.4      Bilirubin, Total 0.9      Alk Phos 42      ALT 10      AST 25      eGFR 53 (*)    URINALYSIS, REFLEX TO URINE CULTURE - Abnormal    Color, UA Light Yellow      Clarity, UA Clear      pH, UA 6.5      Leukocytes, UA Trace (*)     Nitrites, UA Negative      Protein, UA Negative      Glucose, UA Normal      Ketones, UA Negative      Urobilinogen, UA Normal      Bilirubin, UA Negative      Blood, UA Negative      Specific Gravity, UA 1.012     CBC WITH DIFFERENTIAL - Abnormal    WBC 3.09 (*)     RBC 4.45      Hemoglobin 13.1      Hematocrit 40.3      MCV 90.6      MCH 29.4      MCHC 32.5      RDW 12.6      Platelet Count 120 (*)     MPV 11.7      Neutrophils %  44.0 (*)     Lymphocytes % 41.7 (*)     Monocytes % 10.4 (*)     Eosinophils % 2.9      Basophils % 1.0      Immature Granulocytes % 0.0      nRBC, Auto 0.0      Neutrophils, Abs 1.36 (*)     Lymphocytes, Absolute 1.29      Monocytes, Absolute 0.32      Eosinophils, Absolute 0.09      Basophils, Absolute 0.03      Immature Granulocytes, Absolute 0.00      nRBC, Absolute 0.00      Diff Type Auto     URINALYSIS, MICROSCOPIC - Abnormal    WBC, UA 1      RBC, UA <1      Bacteria, UA Occasional (*)     Squamous Epithelial Cells, UA Occasional (*)     Hyaline Casts, UA 0-2 (*)     Mucous Occasional (*)    MAGNESIUM - Normal    Magnesium 1.7     CBC W/ AUTO DIFFERENTIAL    Narrative:     The following orders were created for panel order CBC auto differential.  Procedure                               Abnormality         Status                     ---------                               -----------         ------                     CBC with Differential[[id number]]       Abnormal            Final result                 Please view results for these tests on the individual orders.   POCT GLUCOSE MONITORING CONTINUOUS    POC Glucose 103            Imaging Results              X-Ray Chest AP Portable (Final result)  Result time 03/01/25 14:59:36      Final result by Lee Michaels MD (03/01/25 14:59:36)                   Impression:      1. Chronic appearing interstitial findings, no large focal consolidation.      Electronically signed by: Lee Michaels MD  Date:    03/01/2025  Time:    14:59               Narrative:    EXAMINATION:  XR CHEST AP PORTABLE    CLINICAL HISTORY:  Near-syncope;    TECHNIQUE:  Single frontal view of the chest was performed.    COMPARISON:  03/28/2024    FINDINGS:  The cardiomediastinal silhouette is not enlarged.  There is no pleural effusion.  The trachea is midline.  The lungs are symmetrically expanded bilaterally with coarse interstitial attenuation bilaterally, somewhat more conspicuous  than on the previous exam.  There is biapical atelectasis or scarring.  There is stable volume loss involving the left lower lobe..  No large focal consolidation seen.  There is no pneumothorax.  The osseous structures are remarkable for degenerative change..                                       CT Head Without Contrast (Final result)  Result time 03/01/25 15:06:01      Final result by Lee Michaels MD (03/01/25 15:06:01)                   Impression:      1. No convincing acute intracranial abnormalities noting sequela of chronic microvascular ischemic change, senescent change, and likely remote left basal ganglia infarct.  Please note, the degree of chronic microvascular ischemic change could mask small focus of acute ischemia.  Correlation with current symptomatology advised.      Electronically signed by: Lee Michaels MD  Date:    03/01/2025  Time:    15:06               Narrative:    EXAMINATION:  CT HEAD WITHOUT CONTRAST    CLINICAL HISTORY:  Seizure, new-onset, no history of trauma;    TECHNIQUE:  Low dose axial images were obtained through the head.  Coronal and sagittal reformations were also performed. Contrast was not administered.    COMPARISON:  06/16/2021    FINDINGS:  There is motion artifact.    There is generalized cerebral volume loss.  There is hypoattenuation in a periventricular fashion, likely sequela of chronic microvascular ischemic change.There is a more focal region of low attenuation within the left basal ganglia suggesting remote infarct.  There is no evidence of acute major vascular territory infarct, hemorrhage, or mass.  There is no hydrocephalus.  There are no abnormal extra-axial fluid collections.  The paranasal sinuses and mastoid air cells are clear, and there is no evidence of calvarial fracture.  The visualized soft tissues are unremarkable.                                    X-Rays:   Independently Interpreted Readings:   Other Readings:  Details      Reading  Physician Reading Date Result Lee Vazquez MD  [phone]  3/1/2025 STAT    Narrative & Impression  EXAMINATION:  CT HEAD WITHOUT CONTRAST     CLINICAL HISTORY:  Seizure, new-onset, no history of trauma;     TECHNIQUE:  Low dose axial images were obtained through the head.  Coronal and sagittal reformations were also performed. Contrast was not administered.     COMPARISON:  06/16/2021     FINDINGS:  There is motion artifact.     There is generalized cerebral volume loss.  There is hypoattenuation in a periventricular fashion, likely sequela of chronic microvascular ischemic change.There is a more focal region of low attenuation within the left basal ganglia suggesting remote infarct.  There is no evidence of acute major vascular territory infarct, hemorrhage, or mass.  There is no hydrocephalus.  There are no abnormal extra-axial fluid collections.  The paranasal sinuses and mastoid air cells are clear, and there is no evidence of calvarial fracture.  The visualized soft tissues are unremarkable.     Impression:     1. No convincing acute intracranial abnormalities noting sequela of chronic microvascular ischemic change, senescent change, and likely remote left basal ganglia infarct.  Please note, the degree of chronic microvascular ischemic change could mask small focus of acute ischemia.  Correlation with current symptomatology advised.        Electronically signed by:Lee Michaels MD  Date:                                            03/01/2025  Time:                                           15:06      Exam Ended: 03/01/25 14:26 CST Last Resulted: 03/01/25 15:06 CST    Details      Reading Physician Reading Date Result Lee Vazquez MD  [phone]  3/1/2025 STAT    Narrative & Impression  EXAMINATION:  XR CHEST AP PORTABLE     CLINICAL HISTORY:  Near-syncope;     TECHNIQUE:  Single frontal view of the chest was performed.     COMPARISON:  03/28/2024     FINDINGS:  The  cardiomediastinal silhouette is not enlarged.  There is no pleural effusion.  The trachea is midline.  The lungs are symmetrically expanded bilaterally with coarse interstitial attenuation bilaterally, somewhat more conspicuous than on the previous exam.  There is biapical atelectasis or scarring.  There is stable volume loss involving the left lower lobe..  No large focal consolidation seen.  There is no pneumothorax.  The osseous structures are remarkable for degenerative change..     Impression:     1. Chronic appearing interstitial findings, no large focal consolidation.        Electronically signed by:Lee Michaels MD  Date:                                            03/01/2025  Time:                                           14:59      Exam Ended: 03/01/25 14:29 CST Last Resulted: 03/01/25 14:59 CST        Medications   potassium chloride SA CR tablet 40 mEq (has no administration in time range)   sodium chloride 0.9% bolus 1,000 mL 1,000 mL (1,000 mLs Intravenous New Bag 3/1/25 1421)     Medical Decision Making  Amount and/or Complexity of Data Reviewed  Labs: ordered.  Radiology: ordered.    Risk  Prescription drug management.              Attending Attestation:           Physician Attestation for Scribe:  Physician Attestation Statement for Scribe #1: I, Parker Puckett MD, reviewed documentation, as scribed by Natacha Huerta in my presence, and it is both accurate and complete.             ED Course as of 03/01/25 1554   Sat Mar 01, 2025   1341 Medical decision-making:  Differential diagnosis includes seizure, partial seizure, TIA, orthostatic hypotension, UTI, pneumonia, dehydration.  All testing ordered and interpreted by me. [BB]   1510 03/01/25 1508  CT Head Without Contrast  Performed: 03/01/25 1426  Final         Impression:  1. No convincing acute intracranial abnormalities noting sequela of chronic microvascular ischemic change, senescent change, and likely remote left basal ganglia infarct.  Please  note, the degree of chroni...    03/01/25 1501  X-Ray Chest AP Portable  Performed: 03/01/25 1429  Final         Impression:  1. Chronic appearing interstitial findings, no large focal consolidation.      Electronically signed by: Lee Michaels MD  Date: 03/01/2025  Time: 14:59       [CM]   1522 CT brain shows no acute findings. [BB]   1527 CBC is normal. [BB]   1539 Urinalysis is negative. [BB]   1546 CMP is unremarkable except mild hypokalemia, mildly elevated creatinine which is around baseline for patient.  Magnesium is normal. [BB]   1549 On repeat exam patient remains completely asymptomatic.  Ready for discharge home. [BB]   1553 Patient's blood pressure did improve after IV fluids here.  Patient has been instructed to discontinue hydrochlorothiazide for now. [BB]      ED Course User Index  [BB] Parker Puckett MD  [CM] Natacha Huerta                           Clinical Impression:  Final diagnoses:  [R55] Near syncope  [E87.6] Hypokalemia (Primary)          ED Disposition Condition    Discharge Stable          ED Prescriptions    None       Follow-up Information    None            Parker Puckett MD  03/01/25 1552       [1]   Social History  Tobacco Use    Smoking status: Never     Passive exposure: Past    Smokeless tobacco: Never   Substance Use Topics    Alcohol use: Never    Drug use: Never        Parker Puckett MD  03/01/25 3097

## 2025-03-02 LAB
OHS QRS DURATION: 82 MS
OHS QTC CALCULATION: 446 MS

## 2025-03-13 DIAGNOSIS — I10 PRIMARY HYPERTENSION: ICD-10-CM

## 2025-03-17 RX ORDER — HYDROCHLOROTHIAZIDE 25 MG/1
25 TABLET ORAL
Qty: 90 TABLET | Refills: 0 | OUTPATIENT
Start: 2025-03-17

## 2025-03-17 NOTE — TELEPHONE ENCOUNTER
"Per ADRIENNE Barrios "This med HCTZ that she is requesting refill was held in the ED. She needs to continue to hold this med until she follows up with her PCP."  Spoke w/ , she is aware to continue to hold the medication.     "

## 2025-04-29 ENCOUNTER — OFFICE VISIT (OUTPATIENT)
Dept: NEUROLOGY | Facility: CLINIC | Age: 80
End: 2025-04-29
Payer: MEDICARE

## 2025-04-29 VITALS
HEIGHT: 64 IN | BODY MASS INDEX: 21.17 KG/M2 | OXYGEN SATURATION: 99 % | RESPIRATION RATE: 18 BRPM | WEIGHT: 124 LBS | HEART RATE: 67 BPM | DIASTOLIC BLOOD PRESSURE: 62 MMHG | SYSTOLIC BLOOD PRESSURE: 120 MMHG

## 2025-04-29 DIAGNOSIS — F32.4 MAJOR DEPRESSIVE DISORDER IN PARTIAL REMISSION, UNSPECIFIED WHETHER RECURRENT: Primary | ICD-10-CM

## 2025-04-29 DIAGNOSIS — R55 NEAR SYNCOPE: ICD-10-CM

## 2025-04-29 DIAGNOSIS — F41.9 ANXIETY: ICD-10-CM

## 2025-04-29 PROBLEM — G89.29 CHRONIC LOW BACK PAIN WITH SCIATICA: Status: RESOLVED | Noted: 2024-09-05 | Resolved: 2025-04-29

## 2025-04-29 PROBLEM — M54.40 CHRONIC LOW BACK PAIN WITH SCIATICA: Status: RESOLVED | Noted: 2024-09-05 | Resolved: 2025-04-29

## 2025-04-29 PROCEDURE — 99215 OFFICE O/P EST HI 40 MIN: CPT | Mod: PBBFAC | Performed by: PSYCHIATRY & NEUROLOGY

## 2025-04-29 PROCEDURE — 99999 PR PBB SHADOW E&M-EST. PATIENT-LVL V: CPT | Mod: PBBFAC,,, | Performed by: PSYCHIATRY & NEUROLOGY

## 2025-04-29 PROCEDURE — 99204 OFFICE O/P NEW MOD 45 MIN: CPT | Mod: S$PBB,,, | Performed by: PSYCHIATRY & NEUROLOGY

## 2025-04-29 NOTE — PATIENT INSTRUCTIONS
Full w/u negative to date  Trial of tapering down Lyrica to 100mg daily- may resume prior dose if symptoms recur  Incr water hydration and nutrition   F/u 3 months

## 2025-04-29 NOTE — PROGRESS NOTES
Subjective:       Patient ID: Shelbi May is a 80 y.o. female     Chief Complaint:    Chief Complaint   Patient presents with    syncope     Pt. States near syncope. Back and leg get weak. Can't form words all the time.         Allergies:  Patient has no known allergies.    Current Medications:  Encounter Medications[1]    History of Present Illness  79 yo WF w/ one yr hx of 3- 4 previous spells of near syncope and poss LOC- describes feeling weak in LE's and ending up on floor   Only correlation was increased dose of Lyrica for LBP   Nsgy eval in [place] saw nothing wrong in her neuroimaging   Feels her neuropathic symptoms improved on above Lyrica but symptoms of lightheadedness   Also complete full Cardio and Pulmon w/u negative   Recent CT head in ER NL remote L BG infarct- chronic mv ischemic changes     Major recent life stressor is spouses dx of dementia-AT about one year ago corresponding ans correlating with the onset of her symptoms - adding to her incr stressors              Past Medical History:   Diagnosis Date    Age-related osteoporosis without current pathological fracture 02/26/2024    Allergy     Cancer     colon cancer no treatment     Depression     Diverticula, colon 05/17/2021    History of colon cancer, stage I 05/17/2021    Hyperlipidemia     Stroke        Past Surgical History:   Procedure Laterality Date    TONSILLECTOMY         Social History  Ms. May  reports that she has never smoked. She has been exposed to tobacco smoke. She has never used smokeless tobacco. She reports that she does not drink alcohol and does not use drugs.    Family History  Ms.'s May family history includes Cancer in her brother; Heart disease in her father; Hypertension in her mother.    Review of Systems  Review of Systems   Neurological:  Positive for dizziness.   Psychiatric/Behavioral:  Positive for depression. The patient is nervous/anxious.    All other systems reviewed and are negative.    "  Objective:   /62 (BP Location: Right arm, Patient Position: Sitting)   Pulse 67   Resp 18   Ht 5' 4" (1.626 m)   Wt 56.2 kg (124 lb)   SpO2 99%   BMI 21.28 kg/m²    NEUROLOGICAL EXAMINATION:     MENTAL STATUS   Oriented to person, place, and time.   Level of consciousness: alert  Knowledge: good.     CRANIAL NERVES   Cranial nerves II through XII intact.     MOTOR EXAM     Strength   Strength 5/5 throughout.     SENSORY EXAM        Paresthesias in both LE's      GAIT AND COORDINATION     Gait  Gait: normal       Physical Exam  Vitals reviewed.   Constitutional:       Appearance: She is normal weight.   Neurological:      General: No focal deficit present.      Mental Status: She is alert and oriented to person, place, and time. Mental status is at baseline.      Cranial Nerves: Cranial nerves 2-12 are intact.      Motor: Motor strength is normal.     Gait: Gait is intact.          Assessment:     Major depressive disorder in partial remission, unspecified whether recurrent    Near syncope  -     Ambulatory referral/consult to Neurology    Anxiety         Primary Diagnosis and ICD10  Major depressive disorder in partial remission, unspecified whether recurrent [F32.4]    Plan:     Patient Instructions   Full w/u negative to date  Trial of tapering down Lyrica to 100mg daily- may resume prior dose if symptoms recur  Incr water hydration and nutrition   F/u 3 months     There are no discontinued medications.    Requested Prescriptions      No prescriptions requested or ordered in this encounter            [1]   Outpatient Encounter Medications as of 4/29/2025   Medication Sig Dispense Refill    acetaminophen (TYLENOL) 500 MG tablet Take 500 mg by mouth every 6 (six) hours as needed for Pain.      aspirin (ECOTRIN) 81 MG EC tablet Take 81 mg by mouth once daily.      fluticasone propionate (FLONASE) 50 mcg/actuation nasal spray 2 sprays (100 mcg total) by Each Nostril route once daily. 15.8 mL 1    " hydroCHLOROthiazide (HYDRODIURIL) 25 MG tablet Take 1 tablet (25 mg total) by mouth once daily. 90 tablet 0    loratadine (CLARITIN) 10 mg tablet Take 10 mg by mouth once daily.      meclizine (ANTIVERT) 25 mg tablet Take 1 tablet (25 mg total) by mouth 3 (three) times daily as needed for Dizziness. 30 tablet 5    prednisoLONE acetate (PRED FORTE) 1 % DrpS       pregabalin (LYRICA) 100 MG capsule Take 1 capsule (100 mg total) by mouth 2 (two) times daily. 60 capsule 5     No facility-administered encounter medications on file as of 4/29/2025.

## 2025-05-01 ENCOUNTER — OFFICE VISIT (OUTPATIENT)
Dept: FAMILY MEDICINE | Facility: CLINIC | Age: 80
End: 2025-05-01
Payer: MEDICARE

## 2025-05-01 VITALS
HEART RATE: 67 BPM | TEMPERATURE: 98 F | BODY MASS INDEX: 21.85 KG/M2 | OXYGEN SATURATION: 97 % | SYSTOLIC BLOOD PRESSURE: 98 MMHG | WEIGHT: 128 LBS | RESPIRATION RATE: 18 BRPM | HEIGHT: 64 IN | DIASTOLIC BLOOD PRESSURE: 62 MMHG

## 2025-05-01 DIAGNOSIS — F32.A ANXIETY AND DEPRESSION: Primary | ICD-10-CM

## 2025-05-01 DIAGNOSIS — R55 POSTURAL DIZZINESS WITH PRESYNCOPE: ICD-10-CM

## 2025-05-01 DIAGNOSIS — R60.9 SWELLING: ICD-10-CM

## 2025-05-01 DIAGNOSIS — I10 PRIMARY HYPERTENSION: ICD-10-CM

## 2025-05-01 DIAGNOSIS — F41.9 ANXIETY AND DEPRESSION: Primary | ICD-10-CM

## 2025-05-01 DIAGNOSIS — R42 POSTURAL DIZZINESS WITH PRESYNCOPE: ICD-10-CM

## 2025-05-01 RX ORDER — HYDROCHLOROTHIAZIDE 25 MG/1
25 TABLET ORAL DAILY
Qty: 90 TABLET | Refills: 0 | Status: CANCELLED | OUTPATIENT
Start: 2025-05-01 | End: 2025-07-30

## 2025-05-01 RX ORDER — HYDROCHLOROTHIAZIDE 12.5 MG/1
12.5 TABLET ORAL DAILY PRN
Qty: 30 TABLET | Refills: 11 | Status: SHIPPED | OUTPATIENT
Start: 2025-05-01 | End: 2026-05-01

## 2025-05-01 RX ORDER — SERTRALINE HYDROCHLORIDE 25 MG/1
25 TABLET, FILM COATED ORAL DAILY
Qty: 30 TABLET | Refills: 11 | Status: SHIPPED | OUTPATIENT
Start: 2025-05-01 | End: 2026-05-01

## 2025-05-01 NOTE — PROGRESS NOTES
Subjective:       Patient ID: Shelbi May is a 80 y.o. female.    Chief Complaint: Medication Refill (Would like to discuss starting her Wellbutrin again.  )    The patient described a history of depression and anxiety, initially managed with Wellbutrin, which she discontinued due to experiencing brain fog. She reported seeing Dr. Tolliver, a neurologist, for episodes of near syncope and leg weakness, attributed possibly to Lyrica, which was prescribed for cervical myelopathy and stenosis of the neck and spine. Although the Lyrica helped her symptoms initially, increasing doses were required, and she expressed concerns about its side effects, including drowsiness and foggy cognition. The patient had not yet decreased her Lyrica dose as advised by Dr. Tolliver, pending the initiation of an alternative medication for anxiety. She reported episodes of leg weakness, describing a squeezing feeling in her back that was not painful but required her to rest before continuing activities. She denied any actual loss of consciousness despite feeling close to passing out. The patient expressed interest in restarting Wellbutrin or trying low-dose SSRI options such as Zoloft or Lexapro.    -------------------------------------  Age-related osteoporosis without current pathological fracture  Allergy  Anxiety disorder, unspecified  BPPV (benign paroxysmal positional vertigo)  Cancer      Comment:  colon cancer no treatment   Cervical myelopathy  Depression  Diverticula, colon  History of colon cancer, stage I      Comment:  s/p cancerous polyp removal  Hyperlipidemia  Osteoporosis  Swelling  TIA (transient ischemic attack)         Current Medications[1]    Review of patient's allergies indicates:  No Known Allergies    Past Medical History:   Diagnosis Date    Age-related osteoporosis without current pathological fracture 02/26/2024    Allergy     Anxiety disorder, unspecified     BPPV (benign paroxysmal positional vertigo)      Cancer     colon cancer no treatment     Cervical myelopathy     Depression     Diverticula, colon 05/17/2021    History of colon cancer, stage I 05/17/2021    s/p cancerous polyp removal    Hyperlipidemia     Osteoporosis     Swelling     TIA (transient ischemic attack)        Past Surgical History:   Procedure Laterality Date    TONSILLECTOMY         Family History   Problem Relation Name Age of Onset    Hypertension Mother      Heart disease Father      Cancer Brother         Social History[2]    Review of Systems   Constitutional:  Negative for appetite change, chills, diaphoresis, fatigue and fever.   HENT:  Negative for trouble swallowing.    Eyes:  Negative for visual disturbance.   Respiratory:  Negative for shortness of breath.    Cardiovascular:  Negative for chest pain.   Gastrointestinal:  Negative for abdominal pain, diarrhea, nausea and vomiting.   Endocrine: Negative for polyuria.   Genitourinary:  Negative for bladder incontinence, difficulty urinating and dysuria.   Musculoskeletal:  Positive for back pain. Negative for joint deformity.   Integumentary:  Negative for wound.   Neurological:  Negative for syncope and headaches.   Psychiatric/Behavioral:  Negative for sleep disturbance.          Current Medications:   Medication List with Changes/Refills   New Medications    HYDROCHLOROTHIAZIDE 12.5 MG TAB    Take 1 tablet (12.5 mg total) by mouth daily as needed (swelling).       Start Date: 5/1/2025  End Date: 5/1/2026    SERTRALINE (ZOLOFT) 25 MG TABLET    Take 1 tablet (25 mg total) by mouth once daily.       Start Date: 5/1/2025  End Date: 5/1/2026   Current Medications    ACETAMINOPHEN (TYLENOL) 500 MG TABLET    Take 500 mg by mouth every 6 (six) hours as needed for Pain.       Start Date: --        End Date: --    ASPIRIN (ECOTRIN) 81 MG EC TABLET    Take 81 mg by mouth once daily.       Start Date: --        End Date: --    FLUTICASONE PROPIONATE (FLONASE) 50 MCG/ACTUATION NASAL  "SPRAY    2 sprays (100 mcg total) by Each Nostril route once daily.       Start Date: 3/28/2024 End Date: --    LORATADINE (CLARITIN) 10 MG TABLET    Take 10 mg by mouth once daily.       Start Date: --        End Date: --    MECLIZINE (ANTIVERT) 25 MG TABLET    Take 1 tablet (25 mg total) by mouth 3 (three) times daily as needed for Dizziness.       Start Date: 12/30/2024End Date: --    PREGABALIN (LYRICA) 100 MG CAPSULE    Take 1 capsule (100 mg total) by mouth 2 (two) times daily.       Start Date: 12/30/2024End Date: 6/30/2025   Discontinued Medications    HYDROCHLOROTHIAZIDE (HYDRODIURIL) 25 MG TABLET    Take 1 tablet (25 mg total) by mouth once daily.       Start Date: 12/18/2024End Date: 5/1/2025    PREDNISOLONE ACETATE (PRED FORTE) 1 % DRPS           Start Date: --        End Date: 5/1/2025            Objective:        Vitals:    05/01/25 1100   BP: 98/62   BP Location: Left arm   Patient Position: Sitting   Pulse: 67   Resp: 18   Temp: 97.7 °F (36.5 °C)   TempSrc: Oral   SpO2: 97%   Weight: 58.1 kg (128 lb)   Height: 5' 4" (1.626 m)       Physical Exam  Vitals and nursing note reviewed.   Constitutional:       General: She is not in acute distress.     Appearance: Normal appearance. She is normal weight. She is not ill-appearing, toxic-appearing or diaphoretic.   HENT:      Head: Normocephalic and atraumatic.      Right Ear: External ear normal.      Left Ear: External ear normal.      Nose: Nose normal. No congestion.   Eyes:      General: No scleral icterus.        Right eye: No discharge.         Left eye: No discharge.   Cardiovascular:      Rate and Rhythm: Normal rate and regular rhythm.      Pulses: Normal pulses.      Heart sounds: Normal heart sounds. No murmur heard.  Pulmonary:      Effort: Pulmonary effort is normal. No respiratory distress.      Breath sounds: Normal breath sounds. No wheezing.   Abdominal:      General: There is no distension.      Palpations: Abdomen is soft. "   Musculoskeletal:         General: No swelling, tenderness or deformity.      Cervical back: Neck supple.      Right lower leg: No edema.      Left lower leg: No edema.   Skin:     General: Skin is warm and dry.      Coloration: Skin is not jaundiced or pale.      Findings: No lesion.   Neurological:      Mental Status: She is alert.      Gait: Gait normal.   Psychiatric:         Mood and Affect: Mood normal.         Behavior: Behavior normal.             Lab Results   Component Value Date    WBC 3.09 (L) 03/01/2025    HGB 13.1 03/01/2025    HCT 40.3 03/01/2025     (L) 03/01/2025    ALT 10 03/01/2025    AST 25 03/01/2025     03/01/2025    K 3.3 (L) 03/01/2025     03/01/2025    CREATININE 1.07 (H) 03/01/2025    BUN 22 (H) 03/01/2025    CO2 31 03/01/2025    TSH 1.950 02/26/2024    HGBA1C 5.2 04/26/2024    MICROALBUR 0.9 01/16/2025      Assessment:       1. Anxiety and depression    2. Swelling    3. Postural dizziness with presyncope    4. Primary hypertension        Plan:       ASSESSMENT:    1. Anxiety Disorder: Mild anxiety was evident based on LILY-7 scoring (7pt), possibly exacerbated by her 's recent Alzheimer's diagnosis and medication effects.    2. Depression: Minimal depression per PHQ-9 score (4pt), indicating that the primary concern may be anxiety rather than depression.    3. Presyncope episodes: Potential side effects from Lyrica and Hydrochlorothiazide possibly contributing to dizziness and near syncope episodes. Decreased HCTZ to 12.5mg qd prn for swelling. Agreed with Dr. Tolliver to decrease lyrica to 100mg qd. Patient worries about back tightness. I told her to only take 100mg bid if no relief to qd effect. She agreed to plan  4. Swelling: see above    PLAN:      Treatment:    - Initiated Zoloft at a low dose of 25 mg for anxiety, with plans to reassess.    - Reduced Hydrochlorothiazide to 12.5 mg as needed for swelling, given its potential to lower blood pressure  excessively.    Tests:    - Advised to monitor blood pressure daily for the next week.    Patient Education:    - Educated on the potential side effects of Zoloft and the timeline for effectiveness (4 to 6 weeks).    - Advised on lifestyle modifications, including maintaining hydration and performing resistance exercises to improve overall function and reduce lightheadedness.    Follow-Up:    - Scheduled follow-up appointment in two weeks to monitor response to Zoloft and reassess medication needs.    Disposition:    - Patient was advised to return for follow-up and to contact the clinic if symptoms worsen or new symptoms develop.      Additional Notes:    - Discussed vaccination status, including the need for a second dose of the shingles vaccine. I told her to get it at outside pharmacy.  - Recommended that the patient consider updating the tetanus vaccine if it has been over 10 years since the last dose. Pt not sure if she had one in the past 10 years. Will review records and address next visit.    Problem List Items Addressed This Visit          Psychiatric    Anxiety and depression - Primary    Relevant Medications    sertraline (ZOLOFT) 25 MG tablet       Cardiac/Vascular    HTN (hypertension)       Other    Swelling    Relevant Medications    hydroCHLOROthiazide 12.5 MG Tab    Postural dizziness with presyncope         Follow up in about 2 weeks (around 5/15/2025) for anxiety, swelling, presyncope.    Guzman Caicedo DO     Instructed patient that if symptoms fail to improve or worsen patient should seek immediate medical attention or report to the nearest emergency department. Patient expressed verbal agreement and understanding to this plan of care.              [1]    Current Outpatient Medications:     acetaminophen (TYLENOL) 500 MG tablet, Take 500 mg by mouth every 6 (six) hours as needed for Pain., Disp: , Rfl:     aspirin (ECOTRIN) 81 MG EC tablet, Take 81 mg by mouth once daily., Disp: , Rfl:      fluticasone propionate (FLONASE) 50 mcg/actuation nasal spray, 2 sprays (100 mcg total) by Each Nostril route once daily., Disp: 15.8 mL, Rfl: 1    meclizine (ANTIVERT) 25 mg tablet, Take 1 tablet (25 mg total) by mouth 3 (three) times daily as needed for Dizziness., Disp: 30 tablet, Rfl: 5    pregabalin (LYRICA) 100 MG capsule, Take 1 capsule (100 mg total) by mouth 2 (two) times daily., Disp: 60 capsule, Rfl: 5    hydroCHLOROthiazide 12.5 MG Tab, Take 1 tablet (12.5 mg total) by mouth daily as needed (swelling)., Disp: 30 tablet, Rfl: 11    loratadine (CLARITIN) 10 mg tablet, Take 10 mg by mouth once daily. (Patient not taking: Reported on 5/1/2025), Disp: , Rfl:     sertraline (ZOLOFT) 25 MG tablet, Take 1 tablet (25 mg total) by mouth once daily., Disp: 30 tablet, Rfl: 11  [2]  Social History  Tobacco Use    Smoking status: Never     Passive exposure: Past    Smokeless tobacco: Never   Substance Use Topics    Alcohol use: Never    Drug use: Never

## 2025-05-20 ENCOUNTER — OFFICE VISIT (OUTPATIENT)
Dept: FAMILY MEDICINE | Facility: CLINIC | Age: 80
End: 2025-05-20
Payer: MEDICARE

## 2025-05-20 VITALS
SYSTOLIC BLOOD PRESSURE: 113 MMHG | HEIGHT: 64 IN | BODY MASS INDEX: 21.79 KG/M2 | DIASTOLIC BLOOD PRESSURE: 56 MMHG | WEIGHT: 127.63 LBS | HEART RATE: 63 BPM | TEMPERATURE: 98 F | RESPIRATION RATE: 16 BRPM | OXYGEN SATURATION: 98 %

## 2025-05-20 DIAGNOSIS — F41.9 ANXIETY AND DEPRESSION: ICD-10-CM

## 2025-05-20 DIAGNOSIS — R42 DIZZINESS: ICD-10-CM

## 2025-05-20 DIAGNOSIS — F32.A ANXIETY AND DEPRESSION: ICD-10-CM

## 2025-05-20 DIAGNOSIS — I10 PRIMARY HYPERTENSION: Primary | ICD-10-CM

## 2025-05-20 PROCEDURE — 99212 OFFICE O/P EST SF 10 MIN: CPT | Mod: GC,,, | Performed by: FAMILY MEDICINE

## 2025-05-20 NOTE — PROGRESS NOTES
Subjective:       Patient ID: Shelbi May is a 80 y.o. female.    Chief Complaint: Follow-up    The patient is a 80-year-old female who presented with hypertension and anxiety/depression 2-week followup. She reported one episode where she felt drunk and experienced dizziness. This episode occurred approximately two weeks ago and resolved on its own. She did not experience syncope or head trauma. The patient is currently taking Lyrica 100 mg twice daily and reported a sensation of leg weakness that lasted about two weeks. She believes that the Zoloft medication has helped with her anxiety and depression. The patient has been on Zoloft 25 mg for about three weeks and reported improvement in her symptoms. She completed anxiety and depression questionnaires, which indicated a decrease in scores from mild anxiety and minimal depression to almost no anxiety and minimal depression.    -------------------------------------  Age-related osteoporosis without current pathological fracture  Allergy  Anxiety disorder, unspecified  BPPV (benign paroxysmal positional vertigo)  Cancer      Comment:  colon cancer no treatment   Cervical myelopathy  Depression  Diverticula, colon  History of colon cancer, stage I      Comment:  s/p cancerous polyp removal  Hyperlipidemia  Osteoporosis  Swelling  TIA (transient ischemic attack)           Current Medications[1]    Review of patient's allergies indicates:  No Known Allergies    Past Medical History:   Diagnosis Date    Age-related osteoporosis without current pathological fracture 02/26/2024    Allergy     Anxiety disorder, unspecified     BPPV (benign paroxysmal positional vertigo)     Cancer     colon cancer no treatment     Cervical myelopathy     Depression     Diverticula, colon 05/17/2021    History of colon cancer, stage I 05/17/2021    s/p cancerous polyp removal    Hyperlipidemia     Osteoporosis     Swelling     TIA (transient ischemic attack)        Past Surgical  History:   Procedure Laterality Date    TONSILLECTOMY         Family History   Problem Relation Name Age of Onset    Hypertension Mother      Heart disease Father      Cancer Brother         Social History[2]    Review of Systems   Constitutional:  Negative for activity change, appetite change, chills, fatigue and fever.   HENT:  Negative for trouble swallowing.    Eyes:  Negative for visual disturbance.   Respiratory:  Negative for cough and shortness of breath.    Cardiovascular:  Negative for chest pain.   Gastrointestinal:  Negative for abdominal pain, diarrhea, nausea and vomiting.   Endocrine: Negative for polyuria.   Genitourinary:  Negative for bladder incontinence, difficulty urinating and dysuria.   Musculoskeletal:  Negative for joint deformity.   Integumentary:  Negative for wound.   Neurological:  Positive for dizziness and weakness (leg). Negative for syncope.   Psychiatric/Behavioral:  Negative for sleep disturbance.          Current Medications:   Medication List with Changes/Refills   Current Medications    ACETAMINOPHEN (TYLENOL) 500 MG TABLET    Take 500 mg by mouth every 6 (six) hours as needed for Pain.       Start Date: --        End Date: --    ASPIRIN (ECOTRIN) 81 MG EC TABLET    Take 81 mg by mouth once daily.       Start Date: --        End Date: --    FLUTICASONE PROPIONATE (FLONASE) 50 MCG/ACTUATION NASAL SPRAY    2 sprays (100 mcg total) by Each Nostril route once daily.       Start Date: 3/28/2024 End Date: --    HYDROCHLOROTHIAZIDE 12.5 MG TAB    Take 1 tablet (12.5 mg total) by mouth daily as needed (swelling).       Start Date: 5/1/2025  End Date: 5/1/2026    LORATADINE (CLARITIN) 10 MG TABLET    Take 10 mg by mouth once daily.       Start Date: --        End Date: --    MECLIZINE (ANTIVERT) 25 MG TABLET    Take 1 tablet (25 mg total) by mouth 3 (three) times daily as needed for Dizziness.       Start Date: 12/30/2024End Date: --    PREGABALIN (LYRICA) 100 MG CAPSULE    Take 1  "capsule (100 mg total) by mouth 2 (two) times daily.       Start Date: 12/30/2024End Date: 6/30/2025    SERTRALINE (ZOLOFT) 25 MG TABLET    Take 1 tablet (25 mg total) by mouth once daily.       Start Date: 5/1/2025  End Date: 5/1/2026            Objective:        Vitals:    05/20/25 1104   BP: (!) 113/56   BP Location: Left arm   Patient Position: Sitting   Pulse: 63   Resp: 16   Temp: 98.3 °F (36.8 °C)   TempSrc: Oral   SpO2: 98%   Weight: 57.9 kg (127 lb 9.6 oz)   Height: 5' 4" (1.626 m)       Physical Exam  Vitals and nursing note reviewed.   Constitutional:       General: She is not in acute distress.     Appearance: Normal appearance. She is normal weight. She is not ill-appearing, toxic-appearing or diaphoretic.   HENT:      Head: Normocephalic and atraumatic.      Right Ear: External ear normal.      Left Ear: External ear normal.      Nose: Nose normal. No congestion.   Eyes:      General: No scleral icterus.        Right eye: No discharge.         Left eye: No discharge.      Conjunctiva/sclera: Conjunctivae normal.      Pupils: Pupils are equal, round, and reactive to light.   Cardiovascular:      Rate and Rhythm: Normal rate and regular rhythm.      Pulses: Normal pulses.      Heart sounds: Normal heart sounds. No murmur heard.  Pulmonary:      Effort: Pulmonary effort is normal. No respiratory distress.      Breath sounds: Normal breath sounds. No wheezing.   Abdominal:      General: Bowel sounds are normal. There is no distension.      Palpations: Abdomen is soft.   Musculoskeletal:         General: No swelling, tenderness or deformity.      Cervical back: Neck supple.      Right lower leg: No edema.      Left lower leg: No edema.   Skin:     General: Skin is warm and dry.      Coloration: Skin is not jaundiced or pale.      Findings: No lesion.   Neurological:      Mental Status: She is alert.      Gait: Gait normal.   Psychiatric:         Mood and Affect: Mood normal.         Behavior: Behavior " normal.               Lab Results   Component Value Date    WBC 3.09 (L) 03/01/2025    HGB 13.1 03/01/2025    HCT 40.3 03/01/2025     (L) 03/01/2025    ALT 10 03/01/2025    AST 25 03/01/2025     03/01/2025    K 3.3 (L) 03/01/2025     03/01/2025    CREATININE 1.07 (H) 03/01/2025    BUN 22 (H) 03/01/2025    CO2 31 03/01/2025    TSH 1.950 02/26/2024    HGBA1C 5.2 04/26/2024    MICROALBUR 0.9 01/16/2025      Assessment:       1. Anxiety and depression    2. Primary hypertension    3. Dizziness        Plan:       ASSESSMENT:     1. Primary HTN: BP well controlled on HCTZ 12.5mg qd. Home BP's 120-130s systolic on BP logs.     2. Anxiety and Depression: The patient's anxiety and depression have improved with Zoloft treatment (GAD7 improved from 7pt to 3pt, PHQ9 decreased from 4pt to 1pt). The patient's questionnaire scores show a decrease in anxiety and depression symptoms, supporting the effectiveness of the current treatment regimen.    3. Dizziness: The most likely cause of the dizziness is Lyrica, as it is a known side effect of the medication. The episode resolved spontaneously, indicating a possible transient effect. Plan is to taper Lyrica 100mg bid (how she's been taking them for neuropathic pain) to 100mg qd. She'll take 150mg qd for 1-2 weeks (pt doesn't feel comfortable decreasing directly to 100mg qd right away; and she has 50mg Lyrica at home from previous prescriptions), then reduce to 100mg qd.    PLAN:     Treatment:  - Continue Zoloft 25 mg daily.  - continue HCTZ 12.5mg qd.  - Taper Lyrica to 100mg qd as above.    Tests:   - None indicated.    Patient Education:  - Advised the patient on potential side effects of Lyrica and the importance of monitoring symptoms.  - Explained that raising legs at night may help reduce leg swelling.    Follow-Up:  - Scheduled follow-up in three months with Dr. Daron Meyers.    Disposition:   - Patient to monitor symptoms and contact the clinic if there  are any concerns before the scheduled follow-up.    Problem List Items Addressed This Visit          Psychiatric    Anxiety and depression - Primary       Cardiac/Vascular    HTN (hypertension)       Other    Dizziness         Follow up in about 3 months (around 8/20/2025).    Guzman Caicedo DO     Instructed patient that if symptoms fail to improve or worsen patient should seek immediate medical attention or report to the nearest emergency department. Patient expressed verbal agreement and understanding to this plan of care.            [1]   Current Outpatient Medications:     aspirin (ECOTRIN) 81 MG EC tablet, Take 81 mg by mouth once daily., Disp: , Rfl:     fluticasone propionate (FLONASE) 50 mcg/actuation nasal spray, 2 sprays (100 mcg total) by Each Nostril route once daily., Disp: 15.8 mL, Rfl: 1    hydroCHLOROthiazide 12.5 MG Tab, Take 1 tablet (12.5 mg total) by mouth daily as needed (swelling)., Disp: 30 tablet, Rfl: 11    meclizine (ANTIVERT) 25 mg tablet, Take 1 tablet (25 mg total) by mouth 3 (three) times daily as needed for Dizziness., Disp: 30 tablet, Rfl: 5    pregabalin (LYRICA) 100 MG capsule, Take 1 capsule (100 mg total) by mouth 2 (two) times daily., Disp: 60 capsule, Rfl: 5    sertraline (ZOLOFT) 25 MG tablet, Take 1 tablet (25 mg total) by mouth once daily., Disp: 30 tablet, Rfl: 11    acetaminophen (TYLENOL) 500 MG tablet, Take 500 mg by mouth every 6 (six) hours as needed for Pain., Disp: , Rfl:     loratadine (CLARITIN) 10 mg tablet, Take 10 mg by mouth once daily. (Patient not taking: Reported on 5/20/2025), Disp: , Rfl:   [2]   Social History  Tobacco Use    Smoking status: Never     Passive exposure: Past    Smokeless tobacco: Never   Substance Use Topics    Alcohol use: Never    Drug use: Never

## 2025-07-29 ENCOUNTER — OFFICE VISIT (OUTPATIENT)
Dept: NEUROLOGY | Facility: CLINIC | Age: 80
End: 2025-07-29
Payer: MEDICARE

## 2025-07-29 VITALS
RESPIRATION RATE: 18 BRPM | HEIGHT: 64 IN | OXYGEN SATURATION: 99 % | HEART RATE: 67 BPM | SYSTOLIC BLOOD PRESSURE: 100 MMHG | BODY MASS INDEX: 21.85 KG/M2 | DIASTOLIC BLOOD PRESSURE: 54 MMHG | WEIGHT: 128 LBS

## 2025-07-29 DIAGNOSIS — F41.9 ANXIETY AND DEPRESSION: ICD-10-CM

## 2025-07-29 DIAGNOSIS — H81.10 BPPV (BENIGN PAROXYSMAL POSITIONAL VERTIGO), UNSPECIFIED LATERALITY: ICD-10-CM

## 2025-07-29 DIAGNOSIS — G62.9 NEUROPATHY: Primary | ICD-10-CM

## 2025-07-29 DIAGNOSIS — F32.A ANXIETY AND DEPRESSION: ICD-10-CM

## 2025-07-29 PROCEDURE — 99214 OFFICE O/P EST MOD 30 MIN: CPT | Mod: S$PBB,,, | Performed by: PSYCHIATRY & NEUROLOGY

## 2025-07-29 PROCEDURE — 99214 OFFICE O/P EST MOD 30 MIN: CPT | Mod: PBBFAC | Performed by: PSYCHIATRY & NEUROLOGY

## 2025-07-29 PROCEDURE — 99999 PR PBB SHADOW E&M-EST. PATIENT-LVL IV: CPT | Mod: PBBFAC,,, | Performed by: PSYCHIATRY & NEUROLOGY

## 2025-07-29 RX ORDER — PREGABALIN 165 MG/1
165 TABLET, FILM COATED, EXTENDED RELEASE ORAL DAILY
Qty: 90 TABLET | Refills: 3 | Status: SHIPPED | OUTPATIENT
Start: 2025-07-29

## 2025-07-29 NOTE — PROGRESS NOTES
HPI/Subjective/ROS      History of Present Illness    CHIEF COMPLAINT:  Patient presents for follow-up regarding medication management, particularly focusing on Lyrica dosage and its effects on symptoms.    HPI:  Patient has been taking Lyrica for neuropathic symptoms, primarily weakness in the legs. She was initially on 300 mg daily but tapered down to 200 mg daily (100 mg twice daily) due to side effects, mainly lightheadedness. The reduction in dosage has helped alleviate the lightheadedness. She still has weakness in the legs, especially if late in taking the morning dose. Lyrica helps with these symptoms when taken as prescribed.    Brain fog, a side effect of Lyrica, has improved somewhat with the dosage reduction but persists. She previously tried gabapentin but had severe sedation, sleeping for 2 days.    Her blood pressure has been low, potentially contributing to weakness and lightheadedness. She reduced hydrochlorothiazide dosage from 25 mg to 12.5 mg, but blood pressure remains low. She notes that blood pressure was around 128 when not on any medication.    She was evaluated by cardiology, neurology, and pulmonology a few months ago, with negative findings.    She is unable to tolerate Lyrica at doses higher than 200 mg daily due to increased side effects.    TEST RESULTS:  Patient underwent cardiology, neurological, and pulmonary workups a few months ago, all of which were negative.      ROS:  General: -fever, -chills, -fatigue, -weight gain, -weight loss  Eyes: -vision changes, -redness, -discharge  ENT: -ear pain, -nasal congestion, -sore throat  Cardiovascular: -chest pain, -palpitations, -lower extremity edema  Respiratory: -cough, -shortness of breath  Gastrointestinal: -abdominal pain, -nausea, -vomiting, -diarrhea, -constipation, -blood in stool  Genitourinary: -dysuria, -hematuria, -frequency  Musculoskeletal: -joint pain, -muscle pain, +muscle weakness  Skin: -rash, -lesion  Neurological:  "-headache, +dizziness, -numbness, -tingling, +thought or thinking problems or concerns, +confusion or disorientation  Psychiatric: -anxiety, -depression, -sleep difficulty          Allergies:  Patient has no known allergies.    Current Medications:  Encounter Medications[1]    Past Medical History:   Past Medical History:   Diagnosis Date    Age-related osteoporosis without current pathological fracture 02/26/2024    Allergy     Anxiety disorder, unspecified     BPPV (benign paroxysmal positional vertigo)     Cancer     colon cancer no treatment     Cervical myelopathy     Depression     Diverticula, colon 05/17/2021    History of colon cancer, stage I 05/17/2021    s/p cancerous polyp removal    Hyperlipidemia     Osteoporosis     Swelling     TIA (transient ischemic attack)        Surgical History:   Past Surgical History:   Procedure Laterality Date    TONSILLECTOMY         Social History  Ms. May  reports that she has never smoked. She has been exposed to tobacco smoke. She has never used smokeless tobacco. She reports that she does not drink alcohol and does not use drugs.    Family History  Ms.'s May family history includes Cancer in her brother; Heart disease in her father; Hypertension in her mother.      Objective:   BP (!) 100/54 (BP Location: Right arm, Patient Position: Sitting)   Pulse 67   Resp 18   Ht 5' 4" (1.626 m)   Wt 58.1 kg (128 lb)   SpO2 99%   BMI 21.97 kg/m²          Physical Exam    Vitals: Low blood pressure.  General: No acute distress. Well-developed. Well-nourished.  Eyes: EOMI. Sclerae anicteric.  HENT: Normocephalic. Atraumatic. Nares patent. Moist oral mucosa.  Ears: Bilateral TMs clear. Bilateral EACs clear.  Cardiovascular: Regular rate. Regular rhythm. No murmurs. No rubs. No gallops. Normal S1, S2.  Respiratory: Normal respiratory effort. Clear to auscultation bilaterally. No rales. No rhonchi. No wheezing.  Abdomen: Soft. Non-tender. Non-distended. Normoactive bowel " "sounds.  Musculoskeletal: No  obvious deformity.  Extremities: No lower extremity edema.  Neurological: Alert & oriented x3. No slurred speech. Normal gait.  Psychiatric: Normal mood. Normal affect. Good insight. Good judgment.  Skin: Warm. Dry. No rash.          Assessment:     Assessment & Plan    IMPRESSION:  - Evaluated response to Lyrica taper, noting improvement in lightheadedness at reduced dose, considering balance between symptom relief and side effects, particularly brain fog, at current dose.  - Assessed BP management, noting consistently low readings on current HCTZ dose. Explained that "perfect" BP is not always necessary and can sometimes lead to symptoms like lightheadedness.  - Reviewed recent negative cardiology, neurological, and pulmonary workup results.  - Investigated possibility of extended-release Lyrica formulation as per inquiry.    POLYNEUROPATHY:  - Explained that Lyrica is a neuropathic medication, not specifically for weakness.  - Continue Lyrica 100 mg twice daily.    MEDICATION EFFECTS:  - Discussed age-related changes in medication metabolism, noting increased medication effects after age 71.          Primary Diagnosis and ICD10  Neuropathy [G62.9]    Plan:     There are no Patient Instructions on file for this visit.    Medications Discontinued During This Encounter   Medication Reason    pregabalin (LYRICA) 100 MG capsule        Requested Prescriptions     Pending Prescriptions Disp Refills    pregabalin 165 mg Tb24 90 tablet 3     Sig: Take 165 mg by mouth once daily.       This note was generated with the assistance of ambient listening technology. Verbal consent was obtained by the patient and accompanying visitor(s) for the recording of patient appointment to facilitate this note. I attest to having reviewed and edited the generated note for accuracy, though some syntax or spelling errors may persist. Please contact the author of this note for any clarification.            [1] "   Outpatient Encounter Medications as of 7/29/2025   Medication Sig Dispense Refill    acetaminophen (TYLENOL) 500 MG tablet Take 500 mg by mouth every 6 (six) hours as needed for Pain.      aspirin (ECOTRIN) 81 MG EC tablet Take 81 mg by mouth once daily.      fluticasone propionate (FLONASE) 50 mcg/actuation nasal spray 2 sprays (100 mcg total) by Each Nostril route once daily. 15.8 mL 1    hydroCHLOROthiazide 12.5 MG Tab Take 1 tablet (12.5 mg total) by mouth daily as needed (swelling). 30 tablet 11    loratadine (CLARITIN) 10 mg tablet Take 10 mg by mouth once daily.      meclizine (ANTIVERT) 25 mg tablet Take 1 tablet (25 mg total) by mouth 3 (three) times daily as needed for Dizziness. 30 tablet 5    sertraline (ZOLOFT) 25 MG tablet Take 1 tablet (25 mg total) by mouth once daily. 30 tablet 11    [DISCONTINUED] pregabalin (LYRICA) 100 MG capsule Take 1 capsule (100 mg total) by mouth 2 (two) times daily. 60 capsule 5     No facility-administered encounter medications on file as of 7/29/2025.

## 2025-08-18 ENCOUNTER — OFFICE VISIT (OUTPATIENT)
Dept: FAMILY MEDICINE | Facility: CLINIC | Age: 80
End: 2025-08-18
Payer: MEDICARE

## 2025-08-18 VITALS
BODY MASS INDEX: 21.85 KG/M2 | HEIGHT: 64 IN | SYSTOLIC BLOOD PRESSURE: 116 MMHG | DIASTOLIC BLOOD PRESSURE: 65 MMHG | OXYGEN SATURATION: 98 % | WEIGHT: 128 LBS | HEART RATE: 63 BPM | RESPIRATION RATE: 16 BRPM | TEMPERATURE: 98 F

## 2025-08-18 DIAGNOSIS — Z23 NEED FOR SHINGLES VACCINE: ICD-10-CM

## 2025-08-18 DIAGNOSIS — E78.2 MIXED HYPERLIPIDEMIA: ICD-10-CM

## 2025-08-18 DIAGNOSIS — F41.9 ANXIETY: ICD-10-CM

## 2025-08-18 DIAGNOSIS — G95.9 CERVICAL MYELOPATHY: ICD-10-CM

## 2025-08-18 DIAGNOSIS — R42 DIZZINESS OF UNKNOWN CAUSE: Primary | ICD-10-CM

## 2025-08-18 DIAGNOSIS — E04.2 MULTIPLE THYROID NODULES: ICD-10-CM

## 2025-08-18 DIAGNOSIS — I10 PRIMARY HYPERTENSION: ICD-10-CM

## 2025-08-18 LAB
ALBUMIN SERPL BCP-MCNC: 3.8 G/DL (ref 3.4–4.8)
ALBUMIN/GLOB SERPL: 1.3 {RATIO}
ALP SERPL-CCNC: 34 U/L (ref 40–150)
ALT SERPL W P-5'-P-CCNC: <7 U/L
ANION GAP SERPL CALCULATED.3IONS-SCNC: 10 MMOL/L (ref 7–16)
AST SERPL W P-5'-P-CCNC: 21 U/L (ref 11–45)
BASOPHILS # BLD AUTO: 0.03 K/UL (ref 0–0.2)
BASOPHILS NFR BLD AUTO: 0.7 % (ref 0–1)
BILIRUB SERPL-MCNC: 0.9 MG/DL
BUN SERPL-MCNC: 27 MG/DL (ref 10–20)
BUN/CREAT SERPL: 19 (ref 6–20)
CALCIUM SERPL-MCNC: 9.1 MG/DL (ref 8.4–10.2)
CHLORIDE SERPL-SCNC: 103 MMOL/L (ref 98–107)
CHOLEST SERPL-MCNC: 238 MG/DL
CHOLEST/HDLC SERPL: 4.6 {RATIO}
CO2 SERPL-SCNC: 32 MMOL/L (ref 23–31)
CREAT SERPL-MCNC: 1.45 MG/DL (ref 0.55–1.02)
DIFFERENTIAL METHOD BLD: ABNORMAL
EGFR (NO RACE VARIABLE) (RUSH/TITUS): 37 ML/MIN/1.73M2
EOSINOPHIL # BLD AUTO: 0.32 K/UL (ref 0–0.5)
EOSINOPHIL NFR BLD AUTO: 7.3 % (ref 1–4)
ERYTHROCYTE [DISTWIDTH] IN BLOOD BY AUTOMATED COUNT: 14.1 % (ref 11.5–14.5)
GLOBULIN SER-MCNC: 3 G/DL (ref 2–4)
GLUCOSE SERPL-MCNC: 90 MG/DL (ref 82–115)
HCT VFR BLD AUTO: 37.3 % (ref 38–47)
HDLC SERPL-MCNC: 52 MG/DL (ref 35–60)
HGB BLD-MCNC: 12.4 G/DL (ref 12–16)
IMM GRANULOCYTES # BLD AUTO: 0.01 K/UL (ref 0–0.04)
IMM GRANULOCYTES NFR BLD: 0.2 % (ref 0–0.4)
LDLC SERPL CALC-MCNC: 169 MG/DL
LDLC/HDLC SERPL: 3.3 {RATIO}
LYMPHOCYTES # BLD AUTO: 1.24 K/UL (ref 1–4.8)
LYMPHOCYTES NFR BLD AUTO: 28.1 % (ref 27–41)
MCH RBC QN AUTO: 30.2 PG (ref 27–31)
MCHC RBC AUTO-ENTMCNC: 33.2 G/DL (ref 32–36)
MCV RBC AUTO: 90.8 FL (ref 80–96)
MONOCYTES # BLD AUTO: 0.4 K/UL (ref 0–0.8)
MONOCYTES NFR BLD AUTO: 9.1 % (ref 2–6)
MPC BLD CALC-MCNC: 11.5 FL (ref 9.4–12.4)
NEUTROPHILS # BLD AUTO: 2.41 K/UL (ref 1.8–7.7)
NEUTROPHILS NFR BLD AUTO: 54.6 % (ref 53–65)
NONHDLC SERPL-MCNC: 186 MG/DL
NRBC # BLD AUTO: 0 X10E3/UL
NRBC, AUTO (.00): 0 %
PLATELET # BLD AUTO: 124 K/UL (ref 150–400)
POTASSIUM SERPL-SCNC: 3.7 MMOL/L (ref 3.5–5.1)
PROT SERPL-MCNC: 6.8 G/DL (ref 5.8–7.6)
RBC # BLD AUTO: 4.11 M/UL (ref 4.2–5.4)
SODIUM SERPL-SCNC: 141 MMOL/L (ref 136–145)
TRIGL SERPL-MCNC: 86 MG/DL (ref 37–140)
TSH SERPL DL<=0.005 MIU/L-ACNC: 1.46 UIU/ML (ref 0.35–4.94)
VLDLC SERPL-MCNC: 17 MG/DL
WBC # BLD AUTO: 4.41 K/UL (ref 4.5–11)

## 2025-08-18 PROCEDURE — 80061 LIPID PANEL: CPT | Mod: ,,, | Performed by: CLINICAL MEDICAL LABORATORY

## 2025-08-18 PROCEDURE — 80053 COMPREHEN METABOLIC PANEL: CPT | Mod: ,,, | Performed by: CLINICAL MEDICAL LABORATORY

## 2025-08-18 PROCEDURE — 90471 IMMUNIZATION ADMIN: CPT | Mod: GC,,, | Performed by: FAMILY MEDICINE

## 2025-08-18 PROCEDURE — 99214 OFFICE O/P EST MOD 30 MIN: CPT | Mod: GC,,, | Performed by: FAMILY MEDICINE

## 2025-08-18 PROCEDURE — 84443 ASSAY THYROID STIM HORMONE: CPT | Mod: ,,, | Performed by: CLINICAL MEDICAL LABORATORY

## 2025-08-18 PROCEDURE — 90750 HZV VACC RECOMBINANT IM: CPT | Mod: GC,,, | Performed by: FAMILY MEDICINE

## 2025-08-18 PROCEDURE — 85025 COMPLETE CBC W/AUTO DIFF WBC: CPT | Mod: ,,, | Performed by: CLINICAL MEDICAL LABORATORY

## 2025-08-18 RX ORDER — SERTRALINE HYDROCHLORIDE 50 MG/1
50 TABLET, FILM COATED ORAL DAILY
Qty: 30 TABLET | Refills: 2 | Status: SHIPPED | OUTPATIENT
Start: 2025-08-18 | End: 2025-11-16